# Patient Record
Sex: MALE | Race: WHITE | NOT HISPANIC OR LATINO | Employment: FULL TIME | URBAN - METROPOLITAN AREA
[De-identification: names, ages, dates, MRNs, and addresses within clinical notes are randomized per-mention and may not be internally consistent; named-entity substitution may affect disease eponyms.]

---

## 2017-05-19 ENCOUNTER — HOSPITAL ENCOUNTER (EMERGENCY)
Facility: HOSPITAL | Age: 22
Discharge: HOME | End: 2017-05-19
Attending: EMERGENCY MEDICINE | Admitting: EMERGENCY MEDICINE
Payer: COMMERCIAL

## 2017-05-19 ENCOUNTER — APPOINTMENT (EMERGENCY)
Dept: RADIOLOGY | Facility: HOSPITAL | Age: 22
End: 2017-05-19
Payer: COMMERCIAL

## 2017-05-19 VITALS
BODY MASS INDEX: 46.65 KG/M2 | HEART RATE: 106 BPM | DIASTOLIC BLOOD PRESSURE: 81 MMHG | RESPIRATION RATE: 20 BRPM | TEMPERATURE: 98.5 F | OXYGEN SATURATION: 97 % | WEIGHT: 315 LBS | SYSTOLIC BLOOD PRESSURE: 174 MMHG | HEIGHT: 69 IN

## 2017-05-19 DIAGNOSIS — S92.901A FOOT FRACTURE, RIGHT, CLOSED, INITIAL ENCOUNTER: Primary | ICD-10-CM

## 2017-05-19 PROCEDURE — 99283 EMERGENCY DEPT VISIT LOW MDM: CPT

## 2017-05-19 PROCEDURE — 73630 X-RAY EXAM OF FOOT: CPT

## 2017-05-19 RX ORDER — TRAMADOL HYDROCHLORIDE 50 MG/1
50 TABLET ORAL EVERY 6 HOURS PRN
Qty: 16 TABLET | Refills: 0 | Status: SHIPPED | OUTPATIENT
Start: 2017-05-19 | End: 2017-05-29

## 2017-05-19 RX ORDER — IBUPROFEN 600 MG/1
600 TABLET ORAL ONCE
Status: COMPLETED | OUTPATIENT
Start: 2017-05-19 | End: 2017-05-19

## 2017-05-19 RX ADMIN — IBUPROFEN 600 MG: 600 TABLET, FILM COATED ORAL at 03:27

## 2017-09-20 ENCOUNTER — APPOINTMENT (OUTPATIENT)
Dept: URGENT CARE | Facility: CLINIC | Age: 22
End: 2017-09-20
Payer: OTHER MISCELLANEOUS

## 2017-09-20 ENCOUNTER — HOSPITAL ENCOUNTER (EMERGENCY)
Facility: HOSPITAL | Age: 22
Discharge: HOME | End: 2017-09-20
Attending: EMERGENCY MEDICINE | Admitting: EMERGENCY MEDICINE
Payer: OTHER MISCELLANEOUS

## 2017-09-20 ENCOUNTER — APPOINTMENT (EMERGENCY)
Dept: RADIOLOGY | Facility: HOSPITAL | Age: 22
End: 2017-09-20
Payer: OTHER MISCELLANEOUS

## 2017-09-20 VITALS
SYSTOLIC BLOOD PRESSURE: 148 MMHG | OXYGEN SATURATION: 96 % | HEIGHT: 71 IN | BODY MASS INDEX: 44.1 KG/M2 | HEART RATE: 100 BPM | DIASTOLIC BLOOD PRESSURE: 91 MMHG | TEMPERATURE: 99.5 F | RESPIRATION RATE: 20 BRPM | WEIGHT: 315 LBS

## 2017-09-20 DIAGNOSIS — S93.601A RIGHT FOOT SPRAIN, INITIAL ENCOUNTER: Primary | ICD-10-CM

## 2017-09-20 PROCEDURE — 73630 X-RAY EXAM OF FOOT: CPT

## 2017-09-20 PROCEDURE — 99284 EMERGENCY DEPT VISIT MOD MDM: CPT

## 2017-09-20 PROCEDURE — 99283 EMERGENCY DEPT VISIT LOW MDM: CPT

## 2017-09-20 PROCEDURE — G0383 LEV 4 HOSP TYPE B ED VISIT: HCPCS

## 2017-09-20 RX ORDER — IBUPROFEN 600 MG/1
600 TABLET ORAL ONCE
Status: COMPLETED | OUTPATIENT
Start: 2017-09-20 | End: 2017-09-20

## 2017-09-20 RX ADMIN — IBUPROFEN 600 MG: 600 TABLET, FILM COATED ORAL at 05:07

## 2017-09-25 ENCOUNTER — APPOINTMENT (OUTPATIENT)
Dept: URGENT CARE | Facility: CLINIC | Age: 22
End: 2017-09-25
Payer: OTHER MISCELLANEOUS

## 2017-09-25 PROCEDURE — 99203 OFFICE O/P NEW LOW 30 MIN: CPT

## 2020-05-21 PROCEDURE — 99283 EMERGENCY DEPT VISIT LOW MDM: CPT

## 2020-05-22 ENCOUNTER — HOSPITAL ENCOUNTER (EMERGENCY)
Facility: HOSPITAL | Age: 25
Discharge: HOME | End: 2020-05-22
Attending: EMERGENCY MEDICINE | Admitting: EMERGENCY MEDICINE
Payer: COMMERCIAL

## 2020-05-22 ENCOUNTER — APPOINTMENT (EMERGENCY)
Dept: RADIOLOGY | Facility: HOSPITAL | Age: 25
End: 2020-05-22
Payer: COMMERCIAL

## 2020-05-22 VITALS
HEART RATE: 108 BPM | RESPIRATION RATE: 20 BRPM | DIASTOLIC BLOOD PRESSURE: 97 MMHG | WEIGHT: 315 LBS | OXYGEN SATURATION: 99 % | SYSTOLIC BLOOD PRESSURE: 182 MMHG | HEIGHT: 71 IN | BODY MASS INDEX: 44.1 KG/M2 | TEMPERATURE: 98.2 F

## 2020-05-22 DIAGNOSIS — S83.92XA LEFT KNEE SPRAIN: Primary | ICD-10-CM

## 2020-05-22 PROCEDURE — 73564 X-RAY EXAM KNEE 4 OR MORE: CPT

## 2020-05-22 PROCEDURE — 96374 THER/PROPH/DIAG INJ IV PUSH: CPT

## 2020-05-22 PROCEDURE — 99284 EMERGENCY DEPT VISIT MOD MDM: CPT | Performed by: EMERGENCY MEDICINE

## 2020-05-22 RX ORDER — KETOROLAC TROMETHAMINE 30 MG/ML
15 INJECTION, SOLUTION INTRAMUSCULAR; INTRAVENOUS ONCE
Status: COMPLETED | OUTPATIENT
Start: 2020-05-22 | End: 2020-05-22

## 2020-05-22 RX ORDER — NAPROXEN 500 MG/1
500 TABLET ORAL 2 TIMES DAILY WITH MEALS
Qty: 14 TABLET | Refills: 0 | Status: SHIPPED | OUTPATIENT
Start: 2020-05-22

## 2020-05-22 RX ORDER — ACETAMINOPHEN 325 MG/1
975 TABLET ORAL ONCE
Status: COMPLETED | OUTPATIENT
Start: 2020-05-22 | End: 2020-05-22

## 2020-05-22 RX ADMIN — KETOROLAC TROMETHAMINE 15 MG: 30 INJECTION, SOLUTION INTRAMUSCULAR at 02:27

## 2020-05-22 RX ADMIN — ACETAMINOPHEN 975 MG: 325 TABLET ORAL at 02:26

## 2022-12-29 ENCOUNTER — OFFICE VISIT (OUTPATIENT)
Dept: FAMILY MEDICINE CLINIC | Facility: CLINIC | Age: 27
End: 2022-12-29

## 2022-12-29 VITALS
SYSTOLIC BLOOD PRESSURE: 150 MMHG | RESPIRATION RATE: 18 BRPM | HEART RATE: 95 BPM | OXYGEN SATURATION: 99 % | DIASTOLIC BLOOD PRESSURE: 90 MMHG | TEMPERATURE: 98.6 F | HEIGHT: 71 IN | BODY MASS INDEX: 44.1 KG/M2 | WEIGHT: 315 LBS

## 2022-12-29 DIAGNOSIS — Z11.4 ENCOUNTER FOR SCREENING FOR HIV: ICD-10-CM

## 2022-12-29 DIAGNOSIS — Z13.1 SCREENING FOR DIABETES MELLITUS: ICD-10-CM

## 2022-12-29 DIAGNOSIS — F41.9 ANXIETY: ICD-10-CM

## 2022-12-29 DIAGNOSIS — E73.9 LACTOSE INTOLERANCE: ICD-10-CM

## 2022-12-29 DIAGNOSIS — Z13.6 SCREENING FOR CARDIOVASCULAR CONDITION: ICD-10-CM

## 2022-12-29 DIAGNOSIS — R06.81 WITNESSED EPISODE OF APNEA: ICD-10-CM

## 2022-12-29 DIAGNOSIS — R41.840 ATTENTION AND CONCENTRATION DEFICIT: ICD-10-CM

## 2022-12-29 DIAGNOSIS — R06.83 SNORING: ICD-10-CM

## 2022-12-29 DIAGNOSIS — Z23 ENCOUNTER FOR IMMUNIZATION: ICD-10-CM

## 2022-12-29 DIAGNOSIS — I10 PRIMARY HYPERTENSION: ICD-10-CM

## 2022-12-29 DIAGNOSIS — R10.84 GENERALIZED ABDOMINAL PAIN: Primary | ICD-10-CM

## 2022-12-29 DIAGNOSIS — Z11.59 ENCOUNTER FOR HEPATITIS C SCREENING TEST FOR LOW RISK PATIENT: ICD-10-CM

## 2022-12-29 DIAGNOSIS — R19.4 FREQUENT BOWEL MOVEMENTS: ICD-10-CM

## 2022-12-29 RX ORDER — NAPROXEN SODIUM 220 MG
220 TABLET ORAL 2 TIMES DAILY WITH MEALS
COMMUNITY

## 2022-12-29 NOTE — ASSESSMENT & PLAN NOTE
BMI Counseling: Body mass index is 51 89 kg/m²  The BMI is above normal  Nutrition recommendations include reducing portion sizes, decreasing overall calorie intake and 3-5 servings of fruits/vegetables daily  Exercise recommendations include vigorous aerobic physical activity for 75 minutes/week

## 2022-12-29 NOTE — PROGRESS NOTES
Kp Leaf 1995 male MRN: [id number]    Family Medicine New Patient    ASSESSMENT/PLAN  Problem List Items Addressed This Visit        Cardiovascular and Mediastinum    Primary hypertension    Relevant Orders    Lipid panel    Comprehensive metabolic panel    CBC and differential    TSH, 3rd generation with Free T4 reflex    UA (URINE) with reflex to Scope       Other    Snoring    Relevant Orders    Ambulatory Referral to Sleep Medicine    Witnessed episode of apnea    Relevant Orders    Ambulatory Referral to Sleep Medicine    Lactose intolerance    Frequent bowel movements    Relevant Orders    Lipid panel    Comprehensive metabolic panel    CBC and differential    TSH, 3rd generation with Free T4 reflex    UA (URINE) with reflex to Scope    Stool Enteric Bacterial Panel by PCR    Celiac Disease Panel    BMI 50 0-59 9, adult ([facility])     BMI Counseling: Body mass index is 51 89 kg/m²  The BMI is above normal  Nutrition recommendations include reducing portion sizes, decreasing overall calorie intake and 3-5 servings of fruits/vegetables daily  Exercise recommendations include vigorous aerobic physical activity for 75 minutes/week           Relevant Orders    Lipid panel    Comprehensive metabolic panel    CBC and differential    TSH, 3rd generation with Free T4 reflex    UA (URINE) with reflex to Scope    Generalized abdominal pain - Primary    Relevant Orders    Lipid panel    Comprehensive metabolic panel    CBC and differential    TSH, 3rd generation with Free T4 reflex    UA (URINE) with reflex to Scope    Stool Enteric Bacterial Panel by PCR    Celiac Disease Panel    Attention and concentration deficit    Relevant Orders    Ambulatory Referral to [facility]   Other Visit Diagnoses     Encounter for screening for HIV        Relevant Orders    HIV 1/2 AG/AB W REFLEX [facility] and [facility] only    Encounter for hepatitis C screening test for low risk patient        Relevant Orders    Hepatitis C antibody Screening for diabetes mellitus        Relevant Orders    Lipid panel    Comprehensive metabolic panel    Screening for cardiovascular condition        Relevant Orders    Lipid panel    Comprehensive metabolic panel    Encounter for immunization        Relevant Orders    TDAP VACCINE GREATER THAN OR EQUAL TO 8YO IM (Completed)    Anxiety        Relevant Orders    [facility]          Follow up in 2 months for CP/blood pressure follow up/lab review         Future Appointments   Date Time Provider Alexandra Denise   2/28/2023  3:30 PM DO [facility]          SUBJECTIVE  CC: New Patient Visit (Establish care), GI Problem (Pt reports he is having about 5 solid bowel movements every day  ), Snoring (Pt reports he was told by his wife he snores at night and he stops breathing, pts wife will startle him at night to get him back into normal breathing  Pt reports he tosses and turns at night, has to get up to go to the bathroom  ), and Difficulty staying on task (Pt reports difficulty staying on task and completing tasks at work and at home  Pt would like to be evaluated for ADHD  Modesta Ruth cancelling headphones and music help at times  )      HPI:  Lita Salinas is a 32 y o  male who presents for establish care  He is , 3 kids age 9 through 2  A number of concerns today    HPI    Review of Systems   Constitutional: Negative for chills and fever  HENT: Negative for congestion, postnasal drip, rhinorrhea and sinus pain  Eyes: Negative for photophobia and visual disturbance  Respiratory: Negative for cough and shortness of breath  Cardiovascular: Negative for chest pain, palpitations and leg swelling  Gastrointestinal: Positive for abdominal pain  Negative for constipation, diarrhea, nausea and vomiting  Genitourinary: Negative for difficulty urinating and dysuria  Musculoskeletal: Negative for arthralgias and myalgias     Skin: Negative for rash    Neurological: Negative for dizziness and syncope  Historical Information   The patient history was reviewed as follows:    Past Medical History:   Diagnosis Date   • Malignant hyperthemia     Family history of malignant hyperthermia, pt never tested for it     Past Surgical History:   Procedure Laterality Date   • EAR SURGERY     • FRACTURE SURGERY Right     Rt foot Fx 5th metatarsal surgery 05/2017, with internal pin   • TONSILLECTOMY       History reviewed  No pertinent family history  Social History   Social History     Substance and Sexual Activity   Alcohol Use Yes    Comment: socially     Social History     Substance and Sexual Activity   Drug Use No     Social History     Tobacco Use   Smoking Status Never   Smokeless Tobacco Former   • Quit date: 6/20/2015       Medications:     Current Outpatient Medications:   •  naproxen sodium (Aleve) 220 MG tablet, Take 220 mg by mouth 2 (two) times a day with meals, Disp: , Rfl:   •  naproxen (NAPROSYN) 500 mg tablet, Take 1 tablet (500 mg total) by mouth 2 (two) times a day with meals (Patient not taking: Reported on 12/29/2022), Disp: 14 tablet, Rfl: 0  Allergies   Allergen Reactions   • Brashear [Fish Oil - Food Allergy] Anaphylaxis   • Nuts - Food Allergy Throat Swelling     PISTACHIOS only   • Other      Mold and pollen   • Latex Rash       OBJECTIVE    Vitals:   Vitals:    12/29/22 1529 12/29/22 1602   BP: (!) 160/102 150/90   BP Location: Right arm    Patient Position: Sitting    Cuff Size: Large    Pulse: (!) 120 95   Resp: 18    Temp: 98 6 °F (37 °C)    TempSrc: Tympanic    SpO2: 99%    Weight: (!) 166 kg (366 lb 12 8 oz)    Height: 5' 10 5" (1 791 m)            Physical Exam  Constitutional:       Appearance: He is well-developed  HENT:      Head: Normocephalic and atraumatic        Right Ear: External ear normal       Left Ear: External ear normal    Eyes:      Conjunctiva/sclera: Conjunctivae normal       Pupils: Pupils are equal, round, and reactive to light  Cardiovascular:      Rate and Rhythm: Normal rate and regular rhythm  Heart sounds: Normal heart sounds  No murmur heard  Pulmonary:      Effort: Pulmonary effort is normal  No respiratory distress  Breath sounds: Normal breath sounds  No wheezing  Abdominal:      General: Bowel sounds are normal  There is no distension  Palpations: Abdomen is soft  Musculoskeletal:         General: Normal range of motion  Cervical back: Normal range of motion and neck supple  Skin:     General: Skin is warm and dry  Neurological:      Mental Status: He is alert and oriented to person, place, and time     Psychiatric:         Behavior: Behavior normal             Labs:        DO Leona    12/29/2022

## 2023-02-21 LAB
ALBUMIN SERPL-MCNC: 4.5 G/DL (ref 4.1–5.2)
ALBUMIN/GLOB SERPL: 1.7 {RATIO} (ref 1.2–2.2)
ALP SERPL-CCNC: 91 IU/L (ref 44–121)
ALT SERPL-CCNC: 25 IU/L (ref 0–44)
APPEARANCE UR: CLEAR
AST SERPL-CCNC: 13 IU/L (ref 0–40)
BASOPHILS # BLD AUTO: 0.1 X10E3/UL (ref 0–0.2)
BASOPHILS NFR BLD AUTO: 0 %
BILIRUB SERPL-MCNC: 0.3 MG/DL (ref 0–1.2)
BILIRUB UR QL STRIP: NEGATIVE
BUN SERPL-MCNC: 12 MG/DL (ref 6–20)
BUN/CREAT SERPL: 17 (ref 9–20)
CALCIUM SERPL-MCNC: 9.5 MG/DL (ref 8.7–10.2)
CHLORIDE SERPL-SCNC: 103 MMOL/L (ref 96–106)
CHOLEST SERPL-MCNC: 170 MG/DL (ref 100–199)
CHOLEST/HDLC SERPL: 5.7 RATIO (ref 0–5)
CO2 SERPL-SCNC: 26 MMOL/L (ref 20–29)
COLOR UR: YELLOW
CREAT SERPL-MCNC: 0.7 MG/DL (ref 0.76–1.27)
EGFR: 130 ML/MIN/1.73
ENDOMYSIUM IGA SER QL: NEGATIVE
EOSINOPHIL # BLD AUTO: 0.3 X10E3/UL (ref 0–0.4)
EOSINOPHIL NFR BLD AUTO: 3 %
ERYTHROCYTE [DISTWIDTH] IN BLOOD BY AUTOMATED COUNT: 14.7 % (ref 11.6–15.4)
GLOBULIN SER-MCNC: 2.6 G/DL (ref 1.5–4.5)
GLUCOSE SERPL-MCNC: 103 MG/DL (ref 70–99)
GLUCOSE UR QL: NEGATIVE
HCT VFR BLD AUTO: 44.4 % (ref 37.5–51)
HCV AB S/CO SERPL IA: NON REACTIVE
HDLC SERPL-MCNC: 30 MG/DL
HGB BLD-MCNC: 14.8 G/DL (ref 13–17.7)
HGB UR QL STRIP: NEGATIVE
HIV 1+2 AB+HIV1 P24 AG SERPL QL IA: NON REACTIVE
IGA SERPL-MCNC: 208 MG/DL (ref 90–386)
IMM GRANULOCYTES # BLD: 0.1 X10E3/UL (ref 0–0.1)
IMM GRANULOCYTES NFR BLD: 1 %
KETONES UR QL STRIP: NEGATIVE
LDLC SERPL CALC-MCNC: 123 MG/DL (ref 0–99)
LEUKOCYTE ESTERASE UR QL STRIP: NEGATIVE
LYMPHOCYTES # BLD AUTO: 2.4 X10E3/UL (ref 0.7–3.1)
LYMPHOCYTES NFR BLD AUTO: 21 %
MCH RBC QN AUTO: 25.9 PG (ref 26.6–33)
MCHC RBC AUTO-ENTMCNC: 33.3 G/DL (ref 31.5–35.7)
MCV RBC AUTO: 78 FL (ref 79–97)
MICRO URNS: NORMAL
MONOCYTES # BLD AUTO: 0.8 X10E3/UL (ref 0.1–0.9)
MONOCYTES NFR BLD AUTO: 7 %
NEUTROPHILS # BLD AUTO: 7.7 X10E3/UL (ref 1.4–7)
NEUTROPHILS NFR BLD AUTO: 68 %
NITRITE UR QL STRIP: NEGATIVE
PH UR STRIP: 6 [PH] (ref 5–7.5)
PLATELET # BLD AUTO: 374 X10E3/UL (ref 150–450)
POTASSIUM SERPL-SCNC: 4.8 MMOL/L (ref 3.5–5.2)
PROT SERPL-MCNC: 7.1 G/DL (ref 6–8.5)
PROT UR QL STRIP: NEGATIVE
RBC # BLD AUTO: 5.72 X10E6/UL (ref 4.14–5.8)
SL AMB VLDL CHOLESTEROL CALC: 17 MG/DL (ref 5–40)
SODIUM SERPL-SCNC: 142 MMOL/L (ref 134–144)
SP GR UR: 1.02 (ref 1–1.03)
TRIGL SERPL-MCNC: 90 MG/DL (ref 0–149)
TSH SERPL DL<=0.005 MIU/L-ACNC: 1.43 UIU/ML (ref 0.45–4.5)
TTG IGA SER-ACNC: <2 U/ML (ref 0–3)
UROBILINOGEN UR STRIP-ACNC: 0.2 MG/DL (ref 0.2–1)
WBC # BLD AUTO: 11.4 X10E3/UL (ref 3.4–10.8)

## 2023-02-28 ENCOUNTER — OFFICE VISIT (OUTPATIENT)
Dept: FAMILY MEDICINE CLINIC | Facility: CLINIC | Age: 28
End: 2023-02-28

## 2023-02-28 ENCOUNTER — APPOINTMENT (OUTPATIENT)
Dept: RADIOLOGY | Facility: CLINIC | Age: 28
End: 2023-02-28

## 2023-02-28 VITALS
BODY MASS INDEX: 44.1 KG/M2 | OXYGEN SATURATION: 98 % | WEIGHT: 315 LBS | HEIGHT: 71 IN | DIASTOLIC BLOOD PRESSURE: 104 MMHG | RESPIRATION RATE: 17 BRPM | TEMPERATURE: 99.1 F | HEART RATE: 104 BPM | SYSTOLIC BLOOD PRESSURE: 162 MMHG

## 2023-02-28 DIAGNOSIS — I10 PRIMARY HYPERTENSION: ICD-10-CM

## 2023-02-28 DIAGNOSIS — G89.29 CHRONIC BILATERAL LOW BACK PAIN WITHOUT SCIATICA: ICD-10-CM

## 2023-02-28 DIAGNOSIS — R73.01 IFG (IMPAIRED FASTING GLUCOSE): ICD-10-CM

## 2023-02-28 DIAGNOSIS — M54.50 CHRONIC BILATERAL LOW BACK PAIN WITHOUT SCIATICA: ICD-10-CM

## 2023-02-28 DIAGNOSIS — Z00.00 ANNUAL PHYSICAL EXAM: Primary | ICD-10-CM

## 2023-02-28 DIAGNOSIS — E78.2 MIXED HYPERLIPIDEMIA: ICD-10-CM

## 2023-02-28 RX ORDER — LISINOPRIL 20 MG/1
20 TABLET ORAL DAILY
Qty: 90 TABLET | Refills: 3 | Status: SHIPPED | OUTPATIENT
Start: 2023-02-28

## 2023-02-28 NOTE — PROGRESS NOTES
[facility]    NAME: Rebeca Almonte  AGE: 32 y o  SEX: male  : 1995     DATE: 2023     Assessment and Plan:     Problem List Items Addressed This Visit        Endocrine    IFG (impaired fasting glucose)       Cardiovascular and Mediastinum    Primary hypertension    Relevant Medications    lisinopril (ZESTRIL) 20 mg tablet       Other    Mixed hyperlipidemia    Chronic bilateral low back pain without sciatica    Relevant Orders    XR spine lumbar minimum 4 views non injury   Other Visit Diagnoses     Annual physical exam    -  Primary          Immunizations and preventive care screenings were discussed with patient today  Appropriate education was printed on patient's after visit summary  Counseling:  Alcohol/drug use: discussed moderation in alcohol intake, the recommendations for healthy alcohol use, and avoidance of illicit drug use  Dental Health: discussed importance of regular tooth brushing, flossing, and dental visits  Injury prevention: discussed safety/seat belts, safety helmets, smoke detectors, carbon dioxide detectors, and smoking near bedding or upholstery  Sexual health: discussed sexually transmitted diseases, partner selection, use of condoms, avoidance of unintended pregnancy, and contraceptive alternatives  · Exercise: the importance of regular exercise/physical activity was discussed  Recommend exercise 3-5 times per week for at least 30 minutes  Return in about 4 weeks (around 3/28/2023) for blood pressure check up  Chief Complaint:     Chief Complaint   Patient presents with   • Annual Exam     Review lab work  History of Present Illness:     Adult Annual Physical   Patient here for a comprehensive physical exam       Diet and Physical Activity  · Diet/Nutrition: well balanced diet  · Exercise: moderate cardiovascular exercise        Depression Screening  PHQ-2/9 Depression Screening    Little interest or pleasure in doing things: 0 - not at all  Feeling down, depressed, or hopeless: 0 - not at all  PHQ-2 Score: 0  PHQ-2 Interpretation: Negative depression screen          Review of Systems:     Review of Systems   Constitutional: Negative for chills and fever  HENT: Negative for congestion, postnasal drip, rhinorrhea and sinus pain  Eyes: Negative for photophobia and visual disturbance  Respiratory: Negative for cough and shortness of breath  Cardiovascular: Negative for chest pain, palpitations and leg swelling  Gastrointestinal: Negative for abdominal pain, constipation, diarrhea, nausea and vomiting  Genitourinary: Negative for difficulty urinating and dysuria  Musculoskeletal: Negative for arthralgias and myalgias  Skin: Negative for rash  Neurological: Negative for dizziness and syncope        Past Medical History:     Past Medical History:   Diagnosis Date   • Malignant hyperthemia     Family history of malignant hyperthermia, pt never tested for it      Past Surgical History:     Past Surgical History:   Procedure Laterality Date   • EAR SURGERY     • FRACTURE SURGERY Right     Rt foot Fx 5th metatarsal surgery 05/2017, with internal pin   • TONSILLECTOMY        Social History:     Social History     Socioeconomic History   • Marital status: /Civil Union     Spouse name: None   • Number of children: None   • Years of education: None   • Highest education level: None   Occupational History   • None   Tobacco Use   • Smoking status: Never   • Smokeless tobacco: Former     Quit date: 6/20/2015   Vaping Use   • Vaping Use: Never used   Substance and Sexual Activity   • Alcohol use: Yes     Comment: socially   • Drug use: No   • Sexual activity: None   Other Topics Concern   • None   Social History Narrative   • None     Social Determinants of Health     Financial Resource Strain: Not on file   Food Insecurity: Not on file   Transportation Needs: Not on file   Physical Activity: Not on file   Stress: Not on file   Social Connections: Not on file   Intimate Partner Violence: Not At Risk   • Fear of Current or Ex-Partner: No   • Emotionally Abused: No   • Physically Abused: No   • Sexually Abused: No   Housing Stability: Not on file      Family History:     History reviewed  No pertinent family history  Current Medications:     Current Outpatient Medications   Medication Sig Dispense Refill   • lisinopril (ZESTRIL) 20 mg tablet Take 1 tablet (20 mg total) by mouth daily 90 tablet 3   • naproxen sodium (ALEVE) 220 MG tablet Take 220 mg by mouth every 12 (twelve) hours as needed       No current facility-administered medications for this visit  Allergies: Allergies   Allergen Reactions   • [place] [Fish Oil - Food Allergy] Anaphylaxis   • Nuts - Food Allergy Throat Swelling     PISTACHIOS only   • Other      Mold and pollen   • Latex Rash      Physical Exam:     BP (!) 162/104 (BP Location: Left arm, Patient Position: Sitting, Cuff Size: Large)   Pulse 104   Temp 99 1 °F (37 3 °C) (Tympanic)   Resp 17   Ht 5' 10 5" (1 791 m)   Wt (!) 166 kg (366 lb 12 8 oz)   SpO2 98%   BMI 51 89 kg/m²     Physical Exam  Constitutional:       General: He is not in acute distress  Appearance: Normal appearance  He is not ill-appearing, toxic-appearing or diaphoretic  HENT:      Head: Normocephalic and atraumatic  Right Ear: Tympanic membrane and ear canal normal       Left Ear: Tympanic membrane and ear canal normal       Nose: Nose normal  No congestion  Mouth/Throat:      Mouth: Mucous membranes are moist       Pharynx: Oropharynx is clear  No oropharyngeal exudate  Eyes:      Extraocular Movements: Extraocular movements intact  Conjunctiva/sclera: Conjunctivae normal       Pupils: Pupils are equal, round, and reactive to light  Cardiovascular:      Rate and Rhythm: Normal rate and regular rhythm  Pulses: Normal pulses        Heart sounds: No murmur heard  Pulmonary:      Effort: Pulmonary effort is normal       Breath sounds: Normal breath sounds  No wheezing, rhonchi or rales  Abdominal:      General: Bowel sounds are normal  There is no distension  Palpations: Abdomen is soft  Tenderness: There is no abdominal tenderness  Musculoskeletal:         General: No swelling or tenderness  Normal range of motion  Cervical back: Normal range of motion and neck supple  Skin:     General: Skin is warm and dry  Capillary Refill: Capillary refill takes less than 2 seconds  Neurological:      General: No focal deficit present  Mental Status: He is alert and oriented to person, place, and time  Cranial Nerves: No cranial nerve deficit  Psychiatric:         Mood and Affect: Mood normal          Behavior: Behavior normal          Thought Content:  Thought content normal           [place], DO   [address]

## 2023-03-02 ENCOUNTER — TELEPHONE (OUTPATIENT)
Dept: FAMILY MEDICINE CLINIC | Facility: CLINIC | Age: 28
End: 2023-03-02

## 2023-03-02 NOTE — TELEPHONE ENCOUNTER
Patient just started on lisinopril Tuesday night  He took his BP last night it was 139/74  Today feels very light headed and tired  Was sent home from work from being so dizzy  I told him to check pressure, it was 142/74  I told him to make sure he is drinking water and we will let him know what to do    Please advise

## 2023-03-14 ENCOUNTER — OFFICE VISIT (OUTPATIENT)
Dept: SLEEP CENTER | Facility: HOSPITAL | Age: 28
End: 2023-03-14

## 2023-03-14 VITALS
SYSTOLIC BLOOD PRESSURE: 138 MMHG | DIASTOLIC BLOOD PRESSURE: 96 MMHG | BODY MASS INDEX: 45.1 KG/M2 | HEIGHT: 70 IN | WEIGHT: 315 LBS | OXYGEN SATURATION: 96 % | HEART RATE: 108 BPM

## 2023-03-14 DIAGNOSIS — G47.19 EXCESSIVE DAYTIME SLEEPINESS: ICD-10-CM

## 2023-03-14 DIAGNOSIS — R06.83 SNORING: ICD-10-CM

## 2023-03-14 DIAGNOSIS — E66.01 MORBID OBESITY (HCC): ICD-10-CM

## 2023-03-14 DIAGNOSIS — F45.8 BRUXISM: ICD-10-CM

## 2023-03-14 DIAGNOSIS — R06.81 WITNESSED EPISODE OF APNEA: ICD-10-CM

## 2023-03-14 DIAGNOSIS — I10 ESSENTIAL HYPERTENSION: ICD-10-CM

## 2023-03-14 DIAGNOSIS — G47.33 OSA (OBSTRUCTIVE SLEEP APNEA): Primary | ICD-10-CM

## 2023-03-14 NOTE — PROGRESS NOTES
Consultation - Kurt  32 y o  male  :1995  DUNG:[id number]  DOS:3/14/2023    Physician Requesting Consult: Alexandra Jaime DO             Reason for Consult : At your kind request I saw Sander Quesada for initial sleep evaluation today  The patient is here to evaluate for suspected Obstructive Sleep Apnea  PFSH, Problem List, Medications & Allergies were reviewed in EMR  Nik Walker  has a past medical history of Malignant hyperthemia  He has a current medication list which includes the following prescription(s): lisinopril and naproxen sodium  HPI: He presents with complaints of snoring since childhood and wife has witnessed apneas  At times he awakens himself with choking or gasping  Symptoms are worse when he sleeps supine and somewhat improved by sleeping in the lateral position  Other Complaints: None  Restless Leg Syndrome: reports no suggestive symptoms  Parasomnia: reports teeth grinding during sleep;   Sleep Routine (on average): Typical Bedtime: 9 PM gets OOB: 5 AM TIB:8 hrs  Sleep latency: upto 60 minutes, he is on his phone for a while but is not struggling to fall asleep   sleep Interruptions:infrequent/nite  Awakens: needing an alarm  Upon awakening: is not always refreshed  Nik Walker reports excessive daytime sleepiness [takes planned naps and dozes off when sedentary at home]  He rated [himself] at Total score: 11 /24 on the [place] Sleepiness Scale  Habits:   reports that he has never smoked  He quit smokeless tobacco use about 7 years ago ;  reports current alcohol use ;[ reports no history of drug use  ];[  E-Cigarette/Vaping   • E-Cigarette Use Never User    ]; Caffeine use:excessive until around 7 PM; Exercise routine: none but is physically active in his job  Family History: Father has obstructive sleep apnea  ROS: Significant for approximately 65 pounds weight gain in past 2 years  He reported no nasal, respiratory or cardiac symptoms  Roanna Schlatter EXAM:  /96 (BP Location: Left arm, Patient Position: Sitting, Cuff Size: Standard)   Pulse (!) 108   Ht 5' 10" (1 778 m)   Wt (!) 166 kg (365 lb)   SpO2 96%   BMI 52 37 kg/m²    General: Well groomed male, well appearing, in no apparent distress  Neurological: Alert and orientated; cooperative; Cranial nerves intact;    Psychiatric: Speech: Clear and coherent; normal mood, affect & thought   Skin: Warm and dry; Color& Hydration good; no facial rashes or lesions   HEENT:  Craniofacial anatomy: normal Sinuses: Non-tender  TMJ: Normal   Eyes: EOM's intact; conjunctiva/corneas clear   Ears: Externally appear normal     Nasal Airway: is patent Septum: Intact; Mucosa: Normal; Turbinates: Normal; Rhinorrhea: None  Mouth: Lips: Normal posture; Dentition: normal   Mucosa: Moist; Hard Palate:normal    Oropharryx: crowded and AP narrowing Tongue: Mallampati:Class IV, Mobile and MacroglossiaSoft Palate:  redundant  Tonsils: absent  Neck:[is thick and excess fatty tissue;] [Neck Circumference: 18 ";] Supple; no abnormal masses; Thyroid: Normal  Trachea: Central     Lymph: No cervical or submandibular Lymhadenopathy  Heart: S1,S2 normal; RRR; no gallop; no murmur s  Lungs: Respiratory Effort: Normal  Air entry good bilaterally  No wheezes  No rales  Abdomen: Obese, soft & non-tender    Extremities: No pedal edema  No clubbing or cyanosis  Musculoskeletal:  Motor normal; Gait: Normal        Reviewed last CBC and CMP that were essentially normal    IMPRESSION: Primary/Secondary Sleep Diagnoses (to Medical or Psych conditions) & Comorbidities   1  SUGAR (obstructive sleep apnea)  Home Study      2  Snoring  Ambulatory Referral to Sleep Medicine      3  Witnessed episode of apnea  Ambulatory Referral to Sleep Medicine      4  Excessive daytime sleepiness        5  Bruxism        6  Essential hypertension        7  Morbid obesity ([facility] [address] )             PLAN:  1   With respect to above conditions, comprehensive counseling provided on pathophysiology; effects on symptoms and comorbidities, diagnostic strategies & limitations; treatment options; risks or no treament; risks & benefits of the various therapeutic options; costs and insurance aspects  2  I advised weight reduction, avoiding sleeping supine, using alcohol or sedating medications close to bed time and on safe driving practices  3  Nocturnal polysomnography is indicated and HST will be scheduled  4   Patient is willing to try Positive airway pressure therapy and will be scheduled for a titration study if indicated  5  Follow-up to be scheduled after the studies to review results, further details of treatment options and to initiate/adjust therapy  Sincerely,      Authenticated electronically on 10/90/98   Board Certified Specialist     Portions of the record may have been created with voice recognition software  Occasional wrong word or "sound a like" substitutions may have occurred due to the inherent limitations of voice recognition software  There may also be notations and random deletions of words or characters from malfunctioning software  Read the chart carefully and recognize, using context, where substitutions/deletions have occurred

## 2023-03-14 NOTE — PATIENT INSTRUCTIONS
What is SUGAR? Obstructive sleep apnea is a common and serious sleep disorder that causes you to stop breathing during sleep  The airway repeatedly becomes blocked, limiting the amount of air that reaches your lungs  When this happens, you may snore loudly or making choking noises as you try to breathe  Your brain and body becomes oxygen deprived and you may wake up  This may happen a few times a night, or in more severe cases, several hundred times a night  Sleep apnea can make you wake up in the morning feeling tired or unrefreshed even though you have had a full night of sleep  During the day, you may feel fatigued, have difficulty concentrating or you may even unintentionally fall asleep  This is because your body is waking up numerous times throughout the night, even though you might not be conscious of each awakening  The lack of oxygen your body receives can have negative long-term consequences for your health  This includes:  High blood pressure  Heart disease  Irregular heart rhythms  Stroke  Pre-diabetes and diabetes  Depression  Testing  An objective evaluation of your sleep may be needed before your board certified sleep physician can make a diagnosis  Options include:   In-lab overnight sleep study  This type of sleep study requires you to stay overnight at a sleep center, in a bed that may resemble a hotel room  You will sleep with sensors hooked up to various parts of your body  These sensors record your brain waves, heartbeat, breathing and movement  An overnight sleep study also provides your doctor with the most complete information about your sleep  Learn more about an overnight sleep study  Home sleep apnea test  Some patients with high risk factors for obstructive sleep apnea and no other medical disorders may be candidates for a home sleep apnea test  The testing equipment differs in that it is less complicated than what is used in an overnight sleep study   As such, does not give all the data an in-lab will and if negative, may not mean you do not have the problem  Treatment for sleep apnea includes using a continuous positive airway pressure (CPAP) machine to keep your airway open during sleep  A mask is placed over your nose and mouth, or just your nose  The mask is hooked to the CPAP machine that blows a gentle stream of air into the mask when you breathe  This helps keep your airway open so you can breathe more regularly  Extra oxygen may be given to you through the machine  You may be given a mouth device  It looks like a mouth guard or dental retainer and stops your tongue and mouth tissues from blocking your throat while you sleep  Surgery may be needed to remove extra tissues that block your mouth, throat, or nose  Manage sleep apnea:   Do not smoke  Nicotine and other chemicals in cigarettes and cigars can cause lung damage  Ask your healthcare provider for information if you currently smoke and need help to quit  E-cigarettes or smokeless tobacco still contain nicotine  Talk to your healthcare provider before you use these products  Do not drink alcohol or take sedative medicine before you go to sleep  Alcohol and sedatives can relax the muscles and tissues around your throat  This can block the airflow to your lungs  Maintain a healthy weight  Excess tissue around your throat may restrict your breathing  Ask your healthcare provider for information if you need to lose weight  Sleep on your side or use pillows designed to prevent sleep apnea  This prevents your tongue or other tissues from blocking your throat  You can also raise the head of your bed  Driving Safety  Refrain from driving when drowsy  Follow up with your healthcare provider as directed: Write down your questions so you remember to ask them during your visits  Go to AASM website for more information: Sleepeducation  org  What is SUGAR?    Obstructive sleep apnea is a common and serious sleep disorder that causes you to stop breathing during sleep  The airway repeatedly becomes blocked, limiting the amount of air that reaches your lungs  When this happens, you may snore loudly or making choking noises as you try to breathe  Your brain and body becomes oxygen deprived and you may wake up  This may happen a few times a night, or in more severe cases, several hundred times a night  Sleep apnea can make you wake up in the morning feeling tired or unrefreshed even though you have had a full night of sleep  During the day, you may feel fatigued, have difficulty concentrating or you may even unintentionally fall asleep  This is because your body is waking up numerous times throughout the night, even though you might not be conscious of each awakening  The lack of oxygen your body receives can have negative long-term consequences for your health  This includes:  High blood pressure  Heart disease  Irregular heart rhythms  Stroke  Pre-diabetes and diabetes  Depression  Testing  An objective evaluation of your sleep may be needed before your board certified sleep physician can make a diagnosis  Options include:   In-lab overnight sleep study  This type of sleep study requires you to stay overnight at a sleep center, in a bed that may resemble a hotel room  You will sleep with sensors hooked up to various parts of your body  These sensors record your brain waves, heartbeat, breathing and movement  An overnight sleep study also provides your doctor with the most complete information about your sleep  Learn more about an overnight sleep study  Home sleep apnea test  Some patients with high risk factors for obstructive sleep apnea and no other medical disorders may be candidates for a home sleep apnea test  The testing equipment differs in that it is less complicated than what is used in an overnight sleep study  As such, does not give all the data an in-lab will and if negative, may not mean you do not have the problem    Treatment for sleep apnea includes using a continuous positive airway pressure (CPAP) machine to keep your airway open during sleep  A mask is placed over your nose and mouth, or just your nose  The mask is hooked to the CPAP machine that blows a gentle stream of air into the mask when you breathe  This helps keep your airway open so you can breathe more regularly  Extra oxygen may be given to you through the machine  You may be given a mouth device  It looks like a mouth guard or dental retainer and stops your tongue and mouth tissues from blocking your throat while you sleep  Surgery may be needed to remove extra tissues that block your mouth, throat, or nose  Manage sleep apnea:   Do not smoke  Nicotine and other chemicals in cigarettes and cigars can cause lung damage  Ask your healthcare provider for information if you currently smoke and need help to quit  E-cigarettes or smokeless tobacco still contain nicotine  Talk to your healthcare provider before you use these products  Do not drink alcohol or take sedative medicine before you go to sleep  Alcohol and sedatives can relax the muscles and tissues around your throat  This can block the airflow to your lungs  Maintain a healthy weight  Excess tissue around your throat may restrict your breathing  Ask your healthcare provider for information if you need to lose weight  Sleep on your side or use pillows designed to prevent sleep apnea  This prevents your tongue or other tissues from blocking your throat  You can also raise the head of your bed  Driving Safety  Refrain from driving when drowsy  Follow up with your healthcare provider as directed: Write down your questions so you remember to ask them during your visits  Go to AAS website for more information: Sleepeducation  org    Nursing Support:  When: Monday through Friday 7A-5PM except holidays  Where: Our direct line is [phone]  If you are having a true emergency please call 911    In the event that the line is busy or it is after hours please leave a voice message and we will return your call  Please speak clearly, leaving your full name, birth date, best number to reach you and the reason for your call  Medication refills: We will need the name of the medication, the dosage, the ordering provider, whether you get a 30 or 90 day refill, and the pharmacy name and address  Medications will be ordered by the provider only  Nurses cannot call in prescriptions  Please allow 7 days for medication refills  Physician requested updates: If your provider requested that you call with an update after starting medication, please be ready to provide us the medication and dosage, what time you take your medication, the time you attempt to fall asleep, time you fall asleep, when you wake up, and what time you get out of bed  Sleep Study Results: We will contact you with sleep study results and/or next steps after the physician has reviewed your testing

## 2023-03-28 ENCOUNTER — OFFICE VISIT (OUTPATIENT)
Dept: LAB | Facility: CLINIC | Age: 28
End: 2023-03-28

## 2023-03-28 ENCOUNTER — OFFICE VISIT (OUTPATIENT)
Dept: FAMILY MEDICINE CLINIC | Facility: CLINIC | Age: 28
End: 2023-03-28

## 2023-03-28 VITALS
TEMPERATURE: 98.6 F | DIASTOLIC BLOOD PRESSURE: 84 MMHG | RESPIRATION RATE: 18 BRPM | HEIGHT: 70 IN | BODY MASS INDEX: 45.1 KG/M2 | OXYGEN SATURATION: 98 % | SYSTOLIC BLOOD PRESSURE: 140 MMHG | WEIGHT: 315 LBS | HEART RATE: 111 BPM

## 2023-03-28 DIAGNOSIS — R73.01 IFG (IMPAIRED FASTING GLUCOSE): ICD-10-CM

## 2023-03-28 DIAGNOSIS — E78.2 MIXED HYPERLIPIDEMIA: ICD-10-CM

## 2023-03-28 DIAGNOSIS — I10 PRIMARY HYPERTENSION: Primary | ICD-10-CM

## 2023-03-28 DIAGNOSIS — R79.89 ABNORMAL CBC: ICD-10-CM

## 2023-03-28 DIAGNOSIS — I10 PRIMARY HYPERTENSION: ICD-10-CM

## 2023-03-28 LAB
ATRIAL RATE: 98 BPM
P AXIS: 31 DEGREES
PR INTERVAL: 136 MS
QRS AXIS: 35 DEGREES
QRSD INTERVAL: 104 MS
QT INTERVAL: 340 MS
QTC INTERVAL: 434 MS
T WAVE AXIS: 35 DEGREES
VENTRICULAR RATE: 98 BPM

## 2023-03-28 NOTE — LETTER
March 28, 2023     Patient: Xi Baeza  YOB: 1995  Date of Visit: 3/28/2023      To Whom it May Concern:    Xi Baeza is under my professional care  Liz Marin was seen in my office on 3/28/2023  He is being treated for high blood pressure  He was advised to drink increased water and fluids as he is on medication for treatment  This will cause him to have increased frequency of need for use of the restroom  If you have any questions or concerns, please don't hesitate to call           Sincerely,          DO Leona        CC: No Recipients

## 2023-03-28 NOTE — ASSESSMENT & PLAN NOTE
BP is much better today since our last visit  Continue lisinopril 20 mg daily  Gets labs done prior to next visit

## 2023-03-28 NOTE — PROGRESS NOTES
Francoise Zapien 1995 male MRN: [id number]    Family Medicine Follow-up Visit    ASSESSMENT/PLAN  Problem List Items Addressed This Visit        Endocrine    IFG (impaired fasting glucose)    Relevant Orders    Lipid panel    Comprehensive metabolic panel    CBC and differential    Hemoglobin A1C    UA (URINE) with reflex to Scope       Cardiovascular and Mediastinum    Primary hypertension - Primary     BP is much better today since our last visit  Continue lisinopril 20 mg daily  Gets labs done prior to next visit          Relevant Orders    Lipid panel    Comprehensive metabolic panel    CBC and differential    Hemoglobin A1C    UA (URINE) with reflex to Scope    ECG 12 lead       Other    Mixed hyperlipidemia    Relevant Orders    Lipid panel    Comprehensive metabolic panel    CBC and differential    Hemoglobin A1C    UA (URINE) with reflex to Scope    Abnormal CBC    Relevant Orders    CBC and differential     Follow up in May/June for BP follow up with labs prior  Call sooner if any issues or concerns              Future Appointments   Date Time Provider Alexandra Walker   4/24/2023  2:30 PM QU HOME STUDY ROOM QU Sleep lab [facility]          SUBJECTIVE  CC: Blood Pressure Check (Patient reports no problem since starting his Lisinopril 20mg, but notes that he's had to urinate more frequently since starting the medication  )      HPI:  Francoise Zapien is a 32 y o  male who presents for blood pressure follow up  He reports he is tolerating the lisinopril he is drinking more water and feeling better  Home BP readings have been around 140/70  HPI    Review of Systems   Constitutional: Negative for chills and fever  HENT: Negative for congestion, postnasal drip, rhinorrhea and sinus pain  Eyes: Negative for photophobia and visual disturbance  Respiratory: Negative for cough and shortness of breath  Cardiovascular: Negative for chest pain, palpitations and leg swelling     Gastrointestinal: Negative "for abdominal pain, constipation, diarrhea, nausea and vomiting  Genitourinary: Negative for difficulty urinating and dysuria  Musculoskeletal: Negative for arthralgias and myalgias  Skin: Negative for rash  Neurological: Negative for dizziness and syncope  Historical Information   The patient history was reviewed as follows:    Past Medical History:   Diagnosis Date   • Malignant hyperthemia     Family history of malignant hyperthermia, pt never tested for it     Past Surgical History:   Procedure Laterality Date   • EAR SURGERY     • FRACTURE SURGERY Right     Rt foot Fx 5th metatarsal surgery 05/2017, with internal pin   • TONSILLECTOMY       History reviewed  No pertinent family history     Social History   Social History     Substance and Sexual Activity   Alcohol Use Yes    Comment: socially     Social History     Substance and Sexual Activity   Drug Use No     Social History     Tobacco Use   Smoking Status Never   Smokeless Tobacco Former   • Quit date: 6/20/2015       Medications:     Current Outpatient Medications:   •  Ibuprofen (ADVIL PO), Take by mouth if needed, Disp: , Rfl:   •  lisinopril (ZESTRIL) 20 mg tablet, Take 1 tablet (20 mg total) by mouth daily, Disp: 90 tablet, Rfl: 3  •  naproxen sodium (ALEVE) 220 MG tablet, Take 220 mg by mouth every 12 (twelve) hours as needed (Patient not taking: Reported on 3/28/2023), Disp: , Rfl:   Allergies   Allergen Reactions   • [place] [Fish Oil - Food Allergy] Anaphylaxis   • Nuts - Food Allergy Throat Swelling     PISTACHIOS only   • Other      Mold and pollen   • Latex Rash       OBJECTIVE    Vitals:   Vitals:    03/28/23 1029 03/28/23 1049   BP: 142/86 140/84   BP Location: Left arm    Patient Position: Sitting    Cuff Size: Large    Pulse: (!) 111    Resp: 18    Temp: 98 6 °F (37 °C)    TempSrc: Tympanic    SpO2: 98%    Weight: (!) 165 kg (364 lb 9 6 oz)    Height: 5' 10\" (1 778 m)            Physical Exam  Constitutional:       Appearance: He " is well-developed  HENT:      Head: Normocephalic and atraumatic  Right Ear: External ear normal       Left Ear: External ear normal    Eyes:      Conjunctiva/sclera: Conjunctivae normal       Pupils: Pupils are equal, round, and reactive to light  Cardiovascular:      Rate and Rhythm: Normal rate and regular rhythm  Heart sounds: Normal heart sounds  No murmur heard  Pulmonary:      Effort: Pulmonary effort is normal  No respiratory distress  Breath sounds: Normal breath sounds  No wheezing  Abdominal:      General: Bowel sounds are normal  There is no distension  Palpations: Abdomen is soft  Musculoskeletal:         General: Normal range of motion  Cervical back: Normal range of motion and neck supple  Skin:     General: Skin is warm and dry  Neurological:      Mental Status: He is alert and oriented to person, place, and time     Psychiatric:         Behavior: Behavior normal             Labs:        DO Leona    3/28/2023

## 2023-03-30 ENCOUNTER — OFFICE VISIT (OUTPATIENT)
Dept: FAMILY MEDICINE CLINIC | Facility: CLINIC | Age: 28
End: 2023-03-30

## 2023-03-30 VITALS
RESPIRATION RATE: 18 BRPM | WEIGHT: 315 LBS | SYSTOLIC BLOOD PRESSURE: 138 MMHG | BODY MASS INDEX: 45.1 KG/M2 | HEART RATE: 98 BPM | HEIGHT: 70 IN | DIASTOLIC BLOOD PRESSURE: 84 MMHG | TEMPERATURE: 98.2 F | OXYGEN SATURATION: 98 %

## 2023-03-30 DIAGNOSIS — H93.8X3 PRESSURE SENSATION IN BOTH EARS: ICD-10-CM

## 2023-03-30 DIAGNOSIS — H61.21 RIGHT EAR IMPACTED CERUMEN: ICD-10-CM

## 2023-03-30 DIAGNOSIS — H65.92 OTITIS MEDIA WITH EFFUSION, LEFT: Primary | ICD-10-CM

## 2023-03-30 RX ORDER — METHYLPREDNISOLONE 4 MG/1
TABLET ORAL
Qty: 1 EACH | Refills: 0 | Status: SHIPPED | OUTPATIENT
Start: 2023-03-30

## 2023-03-30 NOTE — PROGRESS NOTES
"Name: Huey Khan      : 1995      MRN: [id number]  Encounter Provider: Sally Maria PA-C  Encounter Date: 3/30/2023   Encounter department: [facility]    Assessment & Plan     1  Otitis media with effusion, left  -     methylPREDNISolone 4 MG tablet therapy pack; Use as directed on package    2  Pressure sensation in both ears  -     methylPREDNISolone 4 MG tablet therapy pack; Use as directed on package    3  Right ear impacted cerumen  -     carbamide peroxide (DEBROX) 6 5 % otic solution; Administer 5 drops to the right ear 2 (two) times a day       Reviewed EKG 2023 which is normal sinus rhythm/normal EKG      Patient to call with any questions, concerns, persistent or worsening symptoms  Patient is a scheduled to return end of May for blood pressure check  Subjective      HPI   26-year-old female here today for sick visit  Patient complaining of waxing and waning dizziness for the last couple days associated with recent URI symptoms and thick nasal mucus which he was gagging on causing him to vomit  Does have some bilateral ear pressure  Denies any fevers or chills  Denies any dyspnea, chest pain, cough or GI symptoms otherwise  No abdominal pain  Review of Systems  As per HPI  All other systems negative    Current Outpatient Medications on File Prior to Visit   Medication Sig   • Ibuprofen (ADVIL PO) Take by mouth if needed   • lisinopril (ZESTRIL) 20 mg tablet Take 1 tablet (20 mg total) by mouth daily   • naproxen sodium (ALEVE) 220 MG tablet Take 220 mg by mouth every 12 (twelve) hours as needed (Patient not taking: Reported on 3/28/2023)       Objective     /84 (BP Location: Left arm, Patient Position: Sitting, Cuff Size: Large)   Pulse 98   Temp 98 2 °F (36 8 °C) (Tympanic)   Resp 18   Ht 5' 10\" (1 778 m)   Wt (!) 166 kg (365 lb)   SpO2 98%   BMI 52 37 kg/m²     Physical Exam  Vitals and nursing note reviewed     Constitutional:  " General: He is not in acute distress  Appearance: He is obese  He is not ill-appearing, toxic-appearing or diaphoretic  HENT:      Head: Normocephalic  Right Ear: There is impacted cerumen  Left Ear: Ear canal and external ear normal  There is no impacted cerumen  Ears:      Comments: Left serous otitis without sign of infection     Nose: Congestion present  Mouth/Throat:      Mouth: Mucous membranes are moist       Comments: Nasal drip  Eyes:      General: No scleral icterus  Conjunctiva/sclera: Conjunctivae normal    Cardiovascular:      Rate and Rhythm: Normal rate and regular rhythm  Heart sounds: No murmur heard  Pulmonary:      Effort: Pulmonary effort is normal  No respiratory distress  Breath sounds: Normal breath sounds  No wheezing, rhonchi or rales  Abdominal:      General: Bowel sounds are normal       Palpations: Abdomen is soft  Tenderness: There is no abdominal tenderness  Musculoskeletal:      Cervical back: Neck supple  Right lower leg: No edema  Left lower leg: No edema  Lymphadenopathy:      Cervical: No cervical adenopathy  Neurological:      General: No focal deficit present  Mental Status: He is alert and oriented to person, place, and time  Motor: No weakness     Psychiatric:         Mood and Affect: Mood normal        Cande Roberts PA-C

## 2023-04-24 ENCOUNTER — HOSPITAL ENCOUNTER (OUTPATIENT)
Dept: SLEEP CENTER | Facility: HOSPITAL | Age: 28
Discharge: HOME | End: 2023-04-24
Attending: INTERNAL MEDICINE

## 2023-04-24 DIAGNOSIS — G47.33 OSA (OBSTRUCTIVE SLEEP APNEA): ICD-10-CM

## 2023-04-26 DIAGNOSIS — E66.01 MORBID OBESITY (HCC): ICD-10-CM

## 2023-04-26 DIAGNOSIS — G47.33 OSA (OBSTRUCTIVE SLEEP APNEA): Primary | ICD-10-CM

## 2023-04-26 NOTE — PROGRESS NOTES
Home Sleep Study Documentation    HOME STUDY DEVICE: Noxturnal no                                           Fatuma G3 yes      Pre-Sleep Home Study:    Set-up and instructions performed by: Eliot Bazzi 61    Technician performed demonstration for Patient: yes    Return demonstration performed by Patient: yes    Written instructions provided to Patient: yes    Patient signed consent form: yes        Post-Sleep Home Study:    Additional comments by Patient: NONE    Home Sleep Study Failed:no:    Failure reason: N/A    Reported or Detected: N/A    Scored by: Renuka Rocha

## 2023-05-09 ENCOUNTER — TELEPHONE (OUTPATIENT)
Dept: SLEEP CENTER | Facility: CLINIC | Age: 28
End: 2023-05-09

## 2023-05-09 NOTE — TELEPHONE ENCOUNTER
Per Dr Kim Rivera patient's home sleep study shows severe SUGAR   GETACHEW 61 [id number]  Dr Kim Rivera ordered CPAP study     Spoke to the patient advised sleep study results  Explained CPAP study   Scheduled appointment and placed patient on wait list

## 2023-05-30 ENCOUNTER — OFFICE VISIT (OUTPATIENT)
Dept: FAMILY MEDICINE CLINIC | Facility: CLINIC | Age: 28
End: 2023-05-30

## 2023-05-30 VITALS
HEART RATE: 113 BPM | WEIGHT: 315 LBS | OXYGEN SATURATION: 98 % | HEIGHT: 70 IN | SYSTOLIC BLOOD PRESSURE: 122 MMHG | RESPIRATION RATE: 18 BRPM | BODY MASS INDEX: 45.1 KG/M2 | TEMPERATURE: 97.9 F | DIASTOLIC BLOOD PRESSURE: 88 MMHG

## 2023-05-30 DIAGNOSIS — G47.33 OSA (OBSTRUCTIVE SLEEP APNEA): Primary | ICD-10-CM

## 2023-05-30 DIAGNOSIS — I10 PRIMARY HYPERTENSION: ICD-10-CM

## 2023-05-30 NOTE — PROGRESS NOTES
Javy Sorensen 1995 male MRN: [id number]    Family Medicine Follow-up Visit    ASSESSMENT/PLAN  Problem List Items Addressed This Visit        Respiratory    SUGAR (obstructive sleep apnea) - Primary     Follows with sleep medicine  Failed home sleep study awaiting in house sleep test for cpap            Cardiovascular and Mediastinum    Primary hypertension     Well controlled on medicine  Continue as prescribed             Follow up in 3 months  Get labs done as previously ordered          Future Appointments   Date Time Provider Alexandra Walker   12/27/2023  8:00  [address] [place] [address] Sleep lab [facility]  CC: Blood Pressure Check      HPI:  Javy Sorensen is a 32 y o  male who presents for blood pressure check up  HPI    Review of Systems   Constitutional: Negative for chills and fever  HENT: Negative for congestion, postnasal drip, rhinorrhea and sinus pain  Eyes: Negative for photophobia and visual disturbance  Respiratory: Negative for cough and shortness of breath  Cardiovascular: Negative for chest pain, palpitations and leg swelling  Gastrointestinal: Negative for abdominal pain, constipation, diarrhea, nausea and vomiting  Genitourinary: Negative for difficulty urinating and dysuria  Musculoskeletal: Negative for arthralgias and myalgias  Skin: Negative for rash  Neurological: Negative for dizziness and syncope  Historical Information   The patient history was reviewed as follows:    Past Medical History:   Diagnosis Date   • Malignant hyperthemia     Family history of malignant hyperthermia, pt never tested for it     Past Surgical History:   Procedure Laterality Date   • EAR SURGERY     • FRACTURE SURGERY Right     Rt foot Fx 5th metatarsal surgery 05/2017, with internal pin   • TONSILLECTOMY       No family history on file     Social History   Social History     Substance and Sexual Activity   Alcohol Use Yes    Comment: socially     Social History "    Substance and Sexual Activity   Drug Use No     Social History     Tobacco Use   Smoking Status Never   Smokeless Tobacco Former   • Quit date: 6/20/2015       Medications:     Current Outpatient Medications:   •  Ibuprofen (ADVIL PO), Take by mouth if needed, Disp: , Rfl:   •  lisinopril (ZESTRIL) 20 mg tablet, Take 1 tablet (20 mg total) by mouth daily, Disp: 90 tablet, Rfl: 3  •  methylPREDNISolone 4 MG tablet therapy pack, Use as directed on package, Disp: 1 each, Rfl: 0  •  carbamide peroxide (DEBROX) 6 5 % otic solution, Administer 5 drops to the right ear 2 (two) times a day (Patient not taking: Reported on 5/30/2023), Disp: 15 mL, Rfl: 0  •  naproxen sodium (ALEVE) 220 MG tablet, Take 220 mg by mouth every 12 (twelve) hours as needed (Patient not taking: Reported on 3/28/2023), Disp: , Rfl:   Allergies   Allergen Reactions   • [place] [Fish Oil - Food Allergy] Anaphylaxis   • Nuts - Food Allergy Throat Swelling     PISTACHIOS only   • Other      Mold and pollen   • Latex Rash       OBJECTIVE    Vitals:   Vitals:    05/30/23 1550   BP: 122/88   BP Location: Right arm   Patient Position: Sitting   Cuff Size: Large   Pulse: (!) 113   Resp: 18   Temp: 97 9 °F (36 6 °C)   TempSrc: Tympanic   SpO2: 98%   Weight: (!) 166 kg (365 lb 3 2 oz)   Height: 5' 10\" (1 778 m)           Physical Exam  Constitutional:       Appearance: He is well-developed  HENT:      Head: Normocephalic and atraumatic  Right Ear: External ear normal       Left Ear: External ear normal    Eyes:      Conjunctiva/sclera: Conjunctivae normal       Pupils: Pupils are equal, round, and reactive to light  Cardiovascular:      Rate and Rhythm: Normal rate and regular rhythm  Heart sounds: Normal heart sounds  No murmur heard  Pulmonary:      Effort: Pulmonary effort is normal  No respiratory distress  Breath sounds: Normal breath sounds  No wheezing  Abdominal:      General: Bowel sounds are normal  There is no distension      " Palpations: Abdomen is soft  Musculoskeletal:         General: Normal range of motion  Cervical back: Normal range of motion and neck supple  Skin:     General: Skin is warm and dry  Neurological:      Mental Status: He is alert and oriented to person, place, and time     Psychiatric:         Behavior: Behavior normal             Labs:        Liz Guo DO    5/30/2023

## 2023-06-19 ENCOUNTER — TELEPHONE (OUTPATIENT)
Dept: FAMILY MEDICINE CLINIC | Facility: CLINIC | Age: 28
End: 2023-06-19

## 2023-06-19 NOTE — TELEPHONE ENCOUNTER
Patient called and was wondering if there was an update on his message  Patient notified that Dr Shira Barkley has not had a chance to address the message  Patient given the option to go to Urgent Care or ER for evaluation today  Patient wanted to set up appointment for this week  Appointment set up with [facility] for evaluation

## 2023-06-19 NOTE — TELEPHONE ENCOUNTER
Finis Maroon called and stated that he is having balance issues today and also he had some fluid in one ear and wax in the other  And would like some advise as to what to do  Please advise,  Best number to reach him is [phone] and if he does not answer please leave a message      Thank You

## 2023-06-21 ENCOUNTER — OFFICE VISIT (OUTPATIENT)
Dept: FAMILY MEDICINE CLINIC | Facility: CLINIC | Age: 28
End: 2023-06-21
Payer: COMMERCIAL

## 2023-06-21 VITALS
SYSTOLIC BLOOD PRESSURE: 130 MMHG | OXYGEN SATURATION: 97 % | HEIGHT: 70 IN | HEART RATE: 100 BPM | RESPIRATION RATE: 20 BRPM | BODY MASS INDEX: 45.1 KG/M2 | DIASTOLIC BLOOD PRESSURE: 85 MMHG | TEMPERATURE: 96.8 F | WEIGHT: 315 LBS

## 2023-06-21 DIAGNOSIS — R42 DIZZY SPELLS: Primary | ICD-10-CM

## 2023-06-21 PROCEDURE — 99213 OFFICE O/P EST LOW 20 MIN: CPT | Performed by: PHYSICIAN ASSISTANT

## 2023-06-21 NOTE — PROGRESS NOTES
"Name: Layne Marcus      : 1995      MRN: [id number]  Encounter Provider: Rosario Thomas PA-C  Encounter Date: 2023   Encounter department: [facility]    Assessment & Plan     1  Dizzy spells  Comments:  Resolved with sleep and hydration    Patient call with any questions, concerns, or recurrent symptoms  To ED with any concerning or worsening symptoms       Subjective      HPI   57-year-old male here today with complaint of having dizzy spells at work couple days ago  Patient states works in a [employer] which is very hot  Possibly some mild dehydration  Patient states lasted throughout the workday  States went home drink quite a bit of Gatorade went to bed and then he he awoke with no symptoms  No recurrent symptoms since that 1 time  Denied any other symptoms  Denies any headaches, nausea, vomiting, chest pain, dyspnea, palpitations, diarrhea or recent URI or illness  Review of Systems  As per HPI  All other systems negative  Current Outpatient Medications on File Prior to Visit   Medication Sig   • Ibuprofen (ADVIL PO) Take by mouth if needed   • lisinopril (ZESTRIL) 20 mg tablet Take 1 tablet (20 mg total) by mouth daily   • carbamide peroxide (DEBROX) 6 5 % otic solution Administer 5 drops to the right ear 2 (two) times a day (Patient not taking: Reported on 2023)   • methylPREDNISolone 4 MG tablet therapy pack Use as directed on package (Patient not taking: Reported on 2023)   • naproxen sodium (ALEVE) 220 MG tablet Take 220 mg by mouth every 12 (twelve) hours as needed (Patient not taking: Reported on 3/28/2023)       Objective     /85   Pulse 100   Temp (!) 96 8 °F (36 °C) (Tympanic)   Resp 20   Ht 5' 10\" (1 778 m)   Wt (!) 164 kg (362 lb 3 2 oz)   SpO2 97%   BMI 51 97 kg/m²     Physical Exam  Vitals and nursing note reviewed  Constitutional:       General: He is not in acute distress       Appearance: He is not ill-appearing, " toxic-appearing or diaphoretic  HENT:      Head: Normocephalic  Right Ear: Tympanic membrane normal       Left Ear: Tympanic membrane normal       Nose: Nose normal       Mouth/Throat:      Mouth: Mucous membranes are moist       Pharynx: Oropharynx is clear  Eyes:      General: No scleral icterus  Extraocular Movements: Extraocular movements intact  Conjunctiva/sclera: Conjunctivae normal       Pupils: Pupils are equal, round, and reactive to light  Cardiovascular:      Rate and Rhythm: Normal rate and regular rhythm  Heart sounds: Normal heart sounds  Pulmonary:      Effort: Pulmonary effort is normal       Breath sounds: Normal breath sounds  Abdominal:      General: Bowel sounds are normal       Palpations: Abdomen is soft  Tenderness: There is no abdominal tenderness  Musculoskeletal:      Right lower leg: No edema  Left lower leg: No edema  Neurological:      General: No focal deficit present  Mental Status: He is alert and oriented to person, place, and time  Cranial Nerves: No cranial nerve deficit  Sensory: No sensory deficit  Motor: No weakness        Coordination: Coordination normal       Gait: Gait normal       Deep Tendon Reflexes: Reflexes normal    Psychiatric:         Mood and Affect: Mood normal        Jared Noriega PA-C

## 2023-08-29 ENCOUNTER — OFFICE VISIT (OUTPATIENT)
Dept: FAMILY MEDICINE CLINIC | Facility: CLINIC | Age: 28
End: 2023-08-29
Payer: COMMERCIAL

## 2023-08-29 VITALS
HEIGHT: 70 IN | TEMPERATURE: 99 F | HEART RATE: 121 BPM | BODY MASS INDEX: 45.1 KG/M2 | SYSTOLIC BLOOD PRESSURE: 136 MMHG | DIASTOLIC BLOOD PRESSURE: 84 MMHG | WEIGHT: 315 LBS | RESPIRATION RATE: 22 BRPM | OXYGEN SATURATION: 96 %

## 2023-08-29 DIAGNOSIS — G47.33 OSA (OBSTRUCTIVE SLEEP APNEA): ICD-10-CM

## 2023-08-29 DIAGNOSIS — E78.2 MIXED HYPERLIPIDEMIA: ICD-10-CM

## 2023-08-29 DIAGNOSIS — I10 PRIMARY HYPERTENSION: Primary | ICD-10-CM

## 2023-08-29 PROCEDURE — 99214 OFFICE O/P EST MOD 30 MIN: CPT | Performed by: FAMILY MEDICINE

## 2023-08-29 RX ORDER — LISINOPRIL 20 MG/1
20 TABLET ORAL DAILY
Qty: 90 TABLET | Refills: 3 | Status: SHIPPED | OUTPATIENT
Start: 2023-08-29

## 2023-08-29 NOTE — ASSESSMENT & PLAN NOTE
BMI Counseling: Body mass index is 52.97 kg/m². The BMI is above normal. Nutrition recommendations include reducing portion sizes, decreasing overall calorie intake and 3-5 servings of fruits/vegetables daily. Exercise recommendations include vigorous aerobic physical activity for 75 minutes/week.

## 2023-08-29 NOTE — ASSESSMENT & PLAN NOTE
Patient did not get his labs done as previously ordered, again encouraged patient to get labs done Patient is an 87 y/o F PMHx mild dementia, MS, breast cancer BIBA from home for abnormal labs i/s/o AMS. Patient was seen by Dr. Barry at in the home and was noticed to be altered with agitation. Outpatient work up was started and she was noted to have WBC 13.9 (with neutrophilic predominance), elevated trop I, elevated CRP (40.4), low serum iron and sent her to ED.        Per [facility] at bedside, Dr. Barry was called when Pt was noted to have . Per  at bedside, Pt has hx dementia and is more agitated than usual. Pt c/o dehydration. Denies chest pain, SOB, fever, cough, headache. Per , Pt has been constipated for the past few weeks and has had a nonproductive cough for the past week, as well as appearing weaker than usual for the past week. No leg swelling. Pt was started on Seroquel this morning.    In the ED,  Vitals: T 97.5 HR 98 /73 RR 18 95% on RA   Labs: WBC WNL, plt 435; .2    UA: + protein, + ketone, blood moderate, <5 RBC, <5 WBC   Imaging:    EKG:    CXR: wet read: no major changes from 2019 CXR, possible trace r side effusion, no focal consolidation  ED Course: s/p 1L NS    Patient is an 87 y/o F PMHx mild dementia, MS, breast cancer BIBA from home for abnormal labs i/s/o AMS. Patient was seen by Dr. Barry at in the home and was noticed to be altered with agitation. Outpatient work up was started and she was noted to have WBC 13.9 (with neutrophilic predominance), elevated trop I, elevated CRP (40.4), low serum iron and sent her to ED. Per health aide and long , patient has been increasingly agitated over the past week, and additionally not leaving bed including in order to urinate and has had a non-productive cough. She has had decreased PO intake.  Denies chest pain, SOB, fever, cough, headache. Of note patient was previously on bupropion 450 mg q daily, her former psychiatrist . Her new psychiatrist decreased and then discontinued bupropion which coincided with patients increased agitation. She was restarted on bupropion 450 mg last week, in addition to seroquel 25 mg in AM and 75 mg in PM.     In the ED,  Vitals: T 97.5 HR 98 /73 RR 18 95% on RA   Labs: WBC WNL, plt 435; .2    UA: + protein, + ketone, blood moderate, <5 RBC, <5 WBC   Imaging:    EKG: NSR with T wave inversion in V5 and V6    CXR: wet read: no major changes from 2019 CXR, possible trace r side effusion, no focal consolidation  ED Course: s/p 1L NS

## 2023-08-29 NOTE — ASSESSMENT & PLAN NOTE
Sourav's BP is doing well on lisinopril 20 mg daily  Continue as prescribed he is tolerating well   Again encouraged him to get his labs done as previously ordered

## 2023-08-29 NOTE — PROGRESS NOTES
Apryl Cobos 1995 male MRN: [id number]    Family Medicine Follow-up Visit    ASSESSMENT/PLAN  Problem List Items Addressed This Visit        Respiratory    SUGAR (obstructive sleep apnea)     He has apt pending with sleep medicine in December            Cardiovascular and Mediastinum    Primary hypertension - Primary     Sourav's BP is doing well on lisinopril 20 mg daily  Continue as prescribed he is tolerating well   Again encouraged him to get his labs done as previously ordered          Relevant Medications    lisinopril (ZESTRIL) 20 mg tablet       Other    BMI 50.0-59.9, adult ([facility])     BMI Counseling: Body mass index is 52.97 kg/m². The BMI is above normal. Nutrition recommendations include reducing portion sizes, decreasing overall calorie intake and 3-5 servings of fruits/vegetables daily. Exercise recommendations include vigorous aerobic physical activity for 75 minutes/week. Mixed hyperlipidemia     Patient did not get his labs done as previously ordered, again encouraged patient to get labs done          Follow up in 3 months or sooner if any issues             Future Appointments   Date Time Provider [address] [place] [address]   12/27/2023  8:00  [facility] [address]  Sleep lab [address] [facility]  CC: Follow-up (3 month check )      HPI:  Apryl Cobos is a 32 y.o. male who presents for check up. HPI    Review of Systems   Constitutional: Negative for chills and fever. HENT: Negative for congestion, postnasal drip, rhinorrhea and sinus pain. Eyes: Negative for photophobia and visual disturbance. Respiratory: Negative for cough and shortness of breath. Cardiovascular: Negative for chest pain, palpitations and leg swelling. Gastrointestinal: Negative for abdominal pain, constipation, diarrhea, nausea and vomiting. Genitourinary: Negative for difficulty urinating and dysuria. Musculoskeletal: Negative for arthralgias and myalgias. Skin: Negative for rash.    Neurological: Negative for dizziness and syncope. Historical Information   The patient history was reviewed as follows:    Past Medical History:   Diagnosis Date   • Malignant hyperthemia     Family history of malignant hyperthermia, pt never tested for it     Past Surgical History:   Procedure Laterality Date   • EAR SURGERY     • FRACTURE SURGERY Right     Rt foot Fx 5th metatarsal surgery 05/2017, with internal pin   • TONSILLECTOMY       No family history on file. Social History   Social History     Substance and Sexual Activity   Alcohol Use Yes    Comment: socially     Social History     Substance and Sexual Activity   Drug Use No     Social History     Tobacco Use   Smoking Status Never   Smokeless Tobacco Former   • Quit date: 6/20/2015       Medications:     Current Outpatient Medications:   •  Ibuprofen (ADVIL PO), Take by mouth if needed, Disp: , Rfl:   •  lisinopril (ZESTRIL) 20 mg tablet, Take 1 tablet (20 mg total) by mouth daily, Disp: 90 tablet, Rfl: 3  •  carbamide peroxide (DEBROX) 6.5 % otic solution, Administer 5 drops to the right ear 2 (two) times a day (Patient not taking: Reported on 5/30/2023), Disp: 15 mL, Rfl: 0  Allergies   Allergen Reactions   • [place] [Fish Oil - Food Allergy] Anaphylaxis   • Nuts - Food Allergy Throat Swelling     PISTACHIOS only   • Other      Mold and pollen   • Latex Rash       OBJECTIVE    Vitals:   Vitals:    08/29/23 1534   BP: 136/84   BP Location: Left arm   Patient Position: Sitting   Cuff Size: Large   Pulse: (!) 121   Resp: 22   Temp: 99 °F (37.2 °C)   TempSrc: Tympanic   SpO2: 96%   Weight: (!) 167 kg (369 lb 3.2 oz)   Height: 5' 10" (1.778 m)           Physical Exam  Constitutional:       Appearance: He is well-developed. HENT:      Head: Normocephalic and atraumatic. Right Ear: External ear normal.      Left Ear: External ear normal.   Eyes:      Conjunctiva/sclera: Conjunctivae normal.      Pupils: Pupils are equal, round, and reactive to light. Cardiovascular:      Rate and Rhythm: Normal rate and regular rhythm. Heart sounds: Normal heart sounds. No murmur heard. Pulmonary:      Effort: Pulmonary effort is normal. No respiratory distress. Breath sounds: Normal breath sounds. No wheezing. Abdominal:      General: Bowel sounds are normal. There is no distension. Palpations: Abdomen is soft. Musculoskeletal:         General: Normal range of motion. Cervical back: Normal range of motion and neck supple. Skin:     General: Skin is warm and dry. Neurological:      Mental Status: He is alert and oriented to person, place, and time.    Psychiatric:         Behavior: Behavior normal.            Labs:        DO Freda    8/29/2023

## 2023-11-29 DIAGNOSIS — I10 PRIMARY HYPERTENSION: ICD-10-CM

## 2023-11-29 RX ORDER — LISINOPRIL 20 MG/1
20 TABLET ORAL DAILY
Qty: 90 TABLET | Refills: 3 | Status: SHIPPED | OUTPATIENT
Start: 2023-11-29

## 2024-01-18 ENCOUNTER — OFFICE VISIT (OUTPATIENT)
Dept: FAMILY MEDICINE CLINIC | Facility: CLINIC | Age: 29
End: 2024-01-18

## 2024-01-18 VITALS
BODY MASS INDEX: 45.1 KG/M2 | OXYGEN SATURATION: 98 % | HEIGHT: 70 IN | DIASTOLIC BLOOD PRESSURE: 74 MMHG | WEIGHT: 315 LBS | HEART RATE: 106 BPM | TEMPERATURE: 97.4 F | SYSTOLIC BLOOD PRESSURE: 156 MMHG | RESPIRATION RATE: 17 BRPM

## 2024-01-18 DIAGNOSIS — J01.00 ACUTE NON-RECURRENT MAXILLARY SINUSITIS: ICD-10-CM

## 2024-01-18 DIAGNOSIS — Z59.89 DOES NOT HAVE HEALTH INSURANCE: ICD-10-CM

## 2024-01-18 DIAGNOSIS — I10 PRIMARY HYPERTENSION: Primary | ICD-10-CM

## 2024-01-18 PROBLEM — Z59.71 DOES NOT HAVE HEALTH INSURANCE: Status: ACTIVE | Noted: 2024-01-18

## 2024-01-18 PROCEDURE — 99214 OFFICE O/P EST MOD 30 MIN: CPT | Performed by: FAMILY MEDICINE

## 2024-01-18 RX ORDER — LISINOPRIL 20 MG/1
20 TABLET ORAL DAILY
Qty: 90 TABLET | Refills: 3 | Status: SHIPPED | OUTPATIENT
Start: 2024-01-18 | End: 2024-01-18 | Stop reason: SDUPTHER

## 2024-01-18 RX ORDER — AMOXICILLIN AND CLAVULANATE POTASSIUM 875; 125 MG/1; MG/1
1 TABLET, FILM COATED ORAL EVERY 12 HOURS SCHEDULED
Qty: 14 TABLET | Refills: 0 | Status: SHIPPED | OUTPATIENT
Start: 2024-01-18 | End: 2024-01-25

## 2024-01-18 RX ORDER — LISINOPRIL 30 MG/1
30 TABLET ORAL DAILY
Qty: 90 TABLET | Refills: 3 | Status: SHIPPED | OUTPATIENT
Start: 2024-01-18

## 2024-01-18 SDOH — ECONOMIC STABILITY - INCOME SECURITY: OTHER PROBLEMS RELATED TO HOUSING AND ECONOMIC CIRCUMSTANCES: Z59.89

## 2024-01-18 NOTE — PROGRESS NOTES
Sourav King 1995 male MRN: [id number]    Family Medicine Follow-up Visit    ASSESSMENT/PLAN  Problem List Items Addressed This Visit          Cardiovascular and Mediastinum    Primary hypertension - Primary     Has been taking his medication as prescribed  Reports home readings are usually in the 130s over 80s  Did not get labs done due to not having insurance, I placed a referral for social work to help with this   His BP is high today  Will increase his lisinopril to 30 mg daily continue close home monitoring           Relevant Medications    lisinopril (ZESTRIL) 30 mg tablet       Other    Does not have health insurance    Relevant Orders    Ambulatory Referral to Social Work Care Management Program       Follow up in 3 months for HTN           No future appointments.       SUBJECTIVE  CC: Hypertension (Routine follow up. )      HPI:  Sourav King is a 28 y.o. male who presents for HTN check up    HPI    Review of Systems   Constitutional:  Negative for chills and fever.   HENT:  Negative for congestion, postnasal drip, rhinorrhea and sinus pain.    Eyes:  Negative for photophobia and visual disturbance.   Respiratory:  Negative for cough and shortness of breath.    Cardiovascular:  Negative for chest pain, palpitations and leg swelling.   Gastrointestinal:  Negative for abdominal pain, constipation, diarrhea, nausea and vomiting.   Genitourinary:  Negative for difficulty urinating and dysuria.   Musculoskeletal:  Negative for arthralgias and myalgias.   Skin:  Negative for rash.   Neurological:  Negative for dizziness and syncope.       Historical Information   The patient history was reviewed as follows:    Past Medical History:   Diagnosis Date    Malignant hyperthemia     Family history of malignant hyperthermia, pt never tested for it     Past Surgical History:   Procedure Laterality Date    EAR SURGERY      FRACTURE SURGERY Right     Rt foot Fx 5th metatarsal surgery 05/2017, with internal pin     "TONSILLECTOMY       No family history on file.   Social History   Social History     Substance and Sexual Activity   Alcohol Use Yes    Comment: socially     Social History     Substance and Sexual Activity   Drug Use No     Social History     Tobacco Use   Smoking Status Never   Smokeless Tobacco Former    Quit date: 6/20/2015       Medications:     Current Outpatient Medications:     Ibuprofen (ADVIL PO), Take by mouth if needed, Disp: , Rfl:     lisinopril (ZESTRIL) 30 mg tablet, Take 1 tablet (30 mg total) by mouth daily, Disp: 90 tablet, Rfl: 3    carbamide peroxide (DEBROX) 6.5 % otic solution, Administer 5 drops to the right ear 2 (two) times a day (Patient not taking: Reported on 5/30/2023), Disp: 15 mL, Rfl: 0  Allergies   Allergen Reactions    [place] [Fish Oil - Food Allergy] Anaphylaxis    Nuts - Food Allergy Throat Swelling     PISTACHIOS only    Other      Mold and pollen    Latex Rash       OBJECTIVE    Vitals:   Vitals:    01/18/24 0854   BP: 156/74   BP Location: Left arm   Patient Position: Sitting   Cuff Size: Large   Pulse: (!) 106   Resp: 17   Temp: (!) 97.4 °F (36.3 °C)   TempSrc: Tympanic   SpO2: 98%   Weight: (!) 163 kg (358 lb 6.4 oz)   Height: 5' 10\" (1.778 m)           Physical Exam  Constitutional:       Appearance: He is well-developed.   HENT:      Head: Normocephalic and atraumatic.      Right Ear: External ear normal.      Left Ear: External ear normal.      Nose: Congestion and rhinorrhea present.   Eyes:      Conjunctiva/sclera: Conjunctivae normal.      Pupils: Pupils are equal, round, and reactive to light.   Cardiovascular:      Rate and Rhythm: Normal rate and regular rhythm.      Heart sounds: Normal heart sounds. No murmur heard.  Pulmonary:      Effort: Pulmonary effort is normal. No respiratory distress.      Breath sounds: Normal breath sounds. No wheezing.   Abdominal:      General: Bowel sounds are normal. There is no distension.      Palpations: Abdomen is soft. "   Musculoskeletal:         General: Normal range of motion.      Cervical back: Normal range of motion and neck supple.   Skin:     General: Skin is warm and dry.   Neurological:      Mental Status: He is alert and oriented to person, place, and time.   Psychiatric:         Behavior: Behavior normal.            Labs:        Roxane Alberto DO    1/18/2024

## 2024-01-18 NOTE — ASSESSMENT & PLAN NOTE
Has been taking his medication as prescribed  Reports home readings are usually in the 130s over 80s  Did not get labs done due to not having insurance, I placed a referral for social work to help with this   His BP is high today  Will increase his lisinopril to 30 mg daily continue close home monitoring

## 2024-01-19 ENCOUNTER — PATIENT OUTREACH (OUTPATIENT)
Dept: FAMILY MEDICINE CLINIC | Facility: CLINIC | Age: 29
End: 2024-01-19

## 2024-01-19 NOTE — PROGRESS NOTES
LEANNE WEI received a referral from patient's PCP due to patient not having insurance. LEANNE WEI reviewed patient's chart and called patient ([phone]). Patient answered and LEANNE WEI introduced LEANNE WEI role and reason for calling.     Patient stated that he has changed jobs and now is unemployed and he no longer has insurance. He has a family of 5 (him, his wife and three children) all who are uninsured. He stated that they live with their in laws. His wife is currently working part time for his [employer]. They are currently caught up on all bills. They have a car as well. LEANNE WEI discussed applying for medicaid. However patient stated that he would like to discuss with his wife first. His wife and  children do not go to a [facility]'s PCP office. LEANNE WEI will follow up with patient next week regarding.

## 2024-01-26 ENCOUNTER — PATIENT OUTREACH (OUTPATIENT)
Dept: FAMILY MEDICINE CLINIC | Facility: CLINIC | Age: 29
End: 2024-01-26

## 2024-01-26 NOTE — PROGRESS NOTES
LEANNE WEI called patient ([phone]) to follow up regarding if he would like to apply for medicaid or not. Patient did not answer, LEANNE WEI left a message including LEANNE WEI contact information and requested a call back. LEANNE WEI will attempt to outreach again at a later date.

## 2024-02-02 ENCOUNTER — PATIENT OUTREACH (OUTPATIENT)
Dept: FAMILY MEDICINE CLINIC | Facility: CLINIC | Age: 29
End: 2024-02-02

## 2024-02-02 NOTE — PROGRESS NOTES
LEANNE WEI received a call/ voicemail from patient ([phone]). Patient stated he is working and was told that he will be eligible for health care insurance after 90 days of working. LEANNE WEI will close. Please re consult LEANNE WEI as needed.

## 2024-08-27 ENCOUNTER — OFFICE VISIT (OUTPATIENT)
Dept: FAMILY MEDICINE CLINIC | Facility: CLINIC | Age: 29
End: 2024-08-27
Payer: COMMERCIAL

## 2024-08-27 VITALS
TEMPERATURE: 99.6 F | OXYGEN SATURATION: 98 % | WEIGHT: 315 LBS | HEIGHT: 70 IN | HEART RATE: 111 BPM | BODY MASS INDEX: 45.1 KG/M2 | SYSTOLIC BLOOD PRESSURE: 142 MMHG | DIASTOLIC BLOOD PRESSURE: 94 MMHG | RESPIRATION RATE: 20 BRPM

## 2024-08-27 DIAGNOSIS — H91.91 HEARING LOSS OF RIGHT EAR, UNSPECIFIED HEARING LOSS TYPE: Primary | ICD-10-CM

## 2024-08-27 DIAGNOSIS — E66.01 MORBID OBESITY WITH BMI OF 50.0-59.9, ADULT (HCC): ICD-10-CM

## 2024-08-27 DIAGNOSIS — R41.3 MEMORY LOSS, SHORT TERM: ICD-10-CM

## 2024-08-27 DIAGNOSIS — R41.840 ATTENTION AND CONCENTRATION DEFICIT: ICD-10-CM

## 2024-08-27 DIAGNOSIS — Z00.00 ANNUAL PHYSICAL EXAM: Primary | ICD-10-CM

## 2024-08-27 DIAGNOSIS — I10 PRIMARY HYPERTENSION: ICD-10-CM

## 2024-08-27 DIAGNOSIS — H91.91 HEARING LOSS OF RIGHT EAR, UNSPECIFIED HEARING LOSS TYPE: ICD-10-CM

## 2024-08-27 DIAGNOSIS — H93.13 TINNITUS OF BOTH EARS: ICD-10-CM

## 2024-08-27 DIAGNOSIS — G47.33 OSA (OBSTRUCTIVE SLEEP APNEA): ICD-10-CM

## 2024-08-27 PROCEDURE — 99395 PREV VISIT EST AGE 18-39: CPT

## 2024-08-27 RX ORDER — LISINOPRIL 40 MG/1
40 TABLET ORAL DAILY
Qty: 90 TABLET | Refills: 1 | Status: SHIPPED | OUTPATIENT
Start: 2024-08-27

## 2024-08-27 NOTE — ASSESSMENT & PLAN NOTE
Body mass index is 51.63 kg/m².  Discussed avoidance of fatty, fried foods, red meat, alcohol, and saturated fats. Discussed healthy dietary choices of lean meats, fish, healthy fats, fruits, vegetables, and whole grains. Discussed recommendations for increased physical activity and weight loss. The patient will obtain the lab work ordered today. The patient will be notified of results when available and also any further recommendations. The patient will begin making healthy lifestyle changes as recommended.

## 2024-08-27 NOTE — ASSESSMENT & PLAN NOTE
The patient reports exposure to loud noises through work and during hunting season. He has noticed a decrease in his right ear hearing. Encouraged the use of ear protection during loud activities. A referral to audiology was placed today per patient request.

## 2024-08-27 NOTE — ASSESSMENT & PLAN NOTE
Saw sleep specialist and had home study done with recommendations for CPAP machine. He did not have insurance at the time and did not pursue this any further. He now has insurance and would like to go back to sleep medicine. A referral to sleep medicine was placed today.

## 2024-08-27 NOTE — ASSESSMENT & PLAN NOTE
His blood pressure is elevated today. He reports he often forgets to take his lisinopril. He takes it 4-5x/week. He reports he cannot find his BP cuff at home. Discussed the importance of medication adherence and encouraged him to find the BP cuff or purchase a new one. He will continue to make changes to his diet to include heart healthy items. The patient will obtain the lab work ordered today. The patient will be notified of results when available and also any further recommendations. We will increase his lisinopril to 40 mg today. Continue home monitoring.

## 2024-08-27 NOTE — PATIENT INSTRUCTIONS
"Please get the lab work ordered today done 1 week prior to your next appointment so we can discuss them at your visit.    Patient Education     Routine physical for adults   The Basics   Written by the doctors and editors at [facility]   What is a physical? -- A physical is a routine visit, or \"check-up,\" with your doctor. You might also hear it called a \"wellness visit\" or \"preventive visit.\"  During each visit, the doctor will:   Ask about your physical and mental health   Ask about your habits, behaviors, and lifestyle   Do an exam   Give you vaccines if needed   Talk to you about any medicines you take   Give advice about your health   Answer your questions  Getting regular check-ups is an important part of taking care of your health. It can help your doctor find and treat any problems you have. But it's also important for preventing health problems.  A routine physical is different from a \"sick visit.\" A sick visit is when you see a doctor because of a health concern or problem. Since physicals are scheduled ahead of time, you can think about what you want to ask the doctor.  How often should I get a physical? -- It depends on your age and health. In general, for people age 21 years and older:   If you are younger than 50 years, you might be able to get a physical every 3 years.   If you are 50 years or older, your doctor might recommend a physical every year.  If you have an ongoing health condition, like diabetes or high blood pressure, your doctor will probably want to see you more often.  What happens during a physical? -- In general, each visit will include:   Physical exam - The doctor or nurse will check your height, weight, heart rate, and blood pressure. They will also look at your eyes and ears. They will ask about how you are feeling and whether you have any symptoms that bother you.   Medicines - It's a good idea to bring a list of all the medicines you take to each doctor visit. Your doctor will talk " "to you about your medicines and answer any questions. Tell them if you are having any side effects that bother you. You should also tell them if you are having trouble paying for any of your medicines.   Habits and behaviors - This includes:   Your diet   Your exercise habits   Whether you smoke, drink alcohol, or use drugs   Whether you are sexually active   Whether you feel safe at home  Your doctor will talk to you about things you can do to improve your health and lower your risk of health problems. They will also offer help and support. For example, if you want to quit smoking, they can give you advice and might prescribe medicines. If you want to improve your diet or get more physical activity, they can help you with this, too.   Lab tests, if needed - The tests you get will depend on your age and situation. For example, your doctor might want to check your:   Cholesterol   Blood sugar   Iron level   Vaccines - The recommended vaccines will depend on your age, health, and what vaccines you already had. Vaccines are very important because they can prevent certain serious or deadly infections.   Discussion of screening - \"Screening\" means checking for diseases or other health problems before they cause symptoms. Your doctor can recommend screening based on your age, risk, and preferences. This might include tests to check for:   Cancer, such as breast, prostate, cervical, ovarian, colorectal, prostate, lung, or skin cancer   Sexually transmitted infections, such as chlamydia and gonorrhea   Mental health conditions like depression and anxiety  Your doctor will talk to you about the different types of screening tests. They can help you decide which screenings to have. They can also explain what the results might mean.   Answering questions - The physical is a good time to ask the doctor or nurse questions about your health. If needed, they can refer you to other doctors or specialists, too.  Adults older than 65 " years often need other care, too. As you get older, your doctor will talk to you about:   How to prevent falling at home   Hearing or vision tests   Memory testing   How to take your medicines safely   Making sure that you have the help and support you need at home  All topics are updated as new evidence becomes available and our peer review process is complete.  This topic retrieved from [employer] on: May 02, 2024.  Topic 631171 Version 1.0  Release: 32.4.3 - C32.122  © 2024 UpToDate, Inc. and/or its affiliates. All rights reserved.  Consumer Information Use and Disclaimer   Disclaimer: This generalized information is a limited summary of diagnosis, treatment, and/or medication information. It is not meant to be comprehensive and should be used as a tool to help the user understand and/or assess potential diagnostic and treatment options. It does NOT include all information about conditions, treatments, medications, side effects, or risks that may apply to a specific patient. It is not intended to be medical advice or a substitute for the medical advice, diagnosis, or treatment of a health care provider based on the health care provider's examination and assessment of a patient's specific and unique circumstances. Patients must speak with a health care provider for complete information about their health, medical questions, and treatment options, including any risks or benefits regarding use of medications. This information does not endorse any treatments or medications as safe, effective, or approved for treating a specific patient. UpToDate, Inc. and its affiliates disclaim any warranty or liability relating to this information or the use thereof.The use of this information is governed by the Terms of Use, available at https://www.wolterskluwer.com/en/know/clinical-effectiveness-terms. 2024© UpToDate, Inc. and its affiliates and/or licensors. All rights reserved.  Copyright   © 2024 UpToDate, Inc. and/or its  "affiliates. All rights reserved.    Patient Education     Weight Loss Diet   About this topic   There are many \"trendy\" weight loss diets that are popular today. Many of these diets can end up being more harmful than helpful. The healthiest way to lose weight is to burn more calories than you eat.  A weight loss diet should help you have a healthy view of eating. It is NOT healthy to stop eating to try and lose weight. A good diet plan will help you cut down your food intake and make healthy choices.  A healthy weight loss goal is 1 to 2 pounds (0.5 to 1 kg) per week. Reducing calories in your diet, burning calories through exercise, or both can help you lose weight. Combining a healthy diet with regular physical activity can help you get the best results.  To cut calories in your diet you can:  Switch from whole milk to 1% or skim milk.  Switch from regular cheese to low-fat or fat-free cheese.  Use healthier condiment choices:  Fat-free or low-fat sour cream or salad dressings  Spray butter  Diet syrups or jellies over regular  Try frozen yogurt as a dessert rather than eating ice cream.  Skip the chips. Snack on carrots, vegetables, or fruit. If chips are a favorite of yours, try the baked style and watch portion size.  Eat grilled, roasted, boiled, broiled, or baked meats. Avoid deep-frying. Choose skinless poultry, lean red meat, lean cuts of pork, and fish for good protein sources.  Try flavored no-calorie koroma. Do not drink soda and juices that have many calories.  Choose fruit instead of sweets.  General   Eating smaller meals more often may be helpful. This will keep you from overeating at your next meal. Also, eating meals slowly helps you feel full faster.  If eating 3 meals is a part of your lifestyle, choose more lean proteins and higher fiber foods to fill you up at each meal.  Do not skip meals. Most often if you skip a meal, you eat too much at the next meal.  Eat smaller portions. Use a smaller " plate or bowl for meals, and when you are eating out, eat half and take the rest home.  Plan ahead. Plan your meals and grocery list before going to the store. Planning will keep you from getting meals from restaurants.  Do not go to the grocery store hungry. You are more likely to buy snacks that are not good for you.  Portion out snacks. When you are having a snack, instead of grabbing the whole bag, portion a small amount out to give yourself a stopping point.  Drink water before and after your meals to help fill you up without the calories.  When eating starchy foods, choose whole-grain products. These have a lot of fiber which will make you feel full. Fiber also helps lower cholesterol and helps with bowel function.  If you need a helpful start, ask your doctor to send you to a dietitian for weight loss help.         What will the results be?   Losing excess weight will make your whole body healthier. You will have more energy for your daily activities and lower your risk for health problems.  What lifestyle changes are needed?   Stay active. Eating healthy is not always enough to lose weight. Burning calories by exercising is a big part of weight loss.  What foods are good to eat?   The key is to watch your portion sizes. It is best to choose foods that are lower in fat and calories.  Choose lean meats:  Boneless, skinless chicken breast  Pork loin  90% lean beef  Lean turkey meat  Fresh fish (not fried)  Choose low-fat dairy products:  1% or skim milk  Spray butter or margarine  Low-fat or fat-free cheese  Frozen yogurt or low-calorie ice cream  Choose fresh fruits, vegetables, beans and lentils, and whole wheat products more often.  Choose water to drink more often. Drink diet or no-calorie beverages when you want something other than water. Aim to get your calories from the foods you eat.  Choose smart snacks:  Fruits  Vegetables  Low-fat or nonfat yogurt  Low-fat or no-fat cheese, such as cottage  cheese  Unsalted nuts  Hard-boiled egg  Hummus  Guacamole  Natural peanut butter  Popcorn with no butter ? use pepper, garlic, or another spice to taste  Whole grain crackers  What foods should be limited or avoided?   Limit high-fat, high-sodium, and high-calorie foods like:  Fried foods  Processed meats  Whole-fat dairy products  Candy, cookies, chips, pastries  Sausage, diaz, any full-fat meats  Soda, juice  Beer, wine, and mixed drinks (alcohol)  Will there be any other care needed?   What do I do first before trying to lose weight?  Talk to your doctor and dietitian to see if you need to lose weight. Work with them to set your weight loss goals.  If you have a chronic illness, such as high blood sugar or high blood pressure, ask a doctor or dietitian what diet and exercise is right for you.  Ask your doctor about how much you are able to exercise and what type of exercise is good for you.  Helpful tips   Keep a food journal to help keep you on track.  Join a support group.  Tips for burning calories:  If your workplace is near your house, choose to walk or bike to work instead of driving.  Take 20-minute walks each day. Walk around during your lunch break. You will not only burn calories, but will raise your energy for the rest of the day.  Take the stairs over the elevator.  Join a gym or exercise class with a friend.  Try to exercise 30 minutes a day for overall health. Three 10-minute sessions work too. Aim for 60 to 90 minutes a day to lose weight.  Drink lots of water before, during, and after exercise.  Last Reviewed Date   2021-06-24  Consumer Information Use and Disclaimer   This generalized information is a limited summary of diagnosis, treatment, and/or medication information. It is not meant to be comprehensive and should be used as a tool to help the user understand and/or assess potential diagnostic and treatment options. It does NOT include all information about conditions, treatments,  medications, side effects, or risks that may apply to a specific patient. It is not intended to be medical advice or a substitute for the medical advice, diagnosis, or treatment of a health care provider based on the health care provider's examination and assessment of a patient’s specific and unique circumstances. Patients must speak with a health care provider for complete information about their health, medical questions, and treatment options, including any risks or benefits regarding use of medications. This information does not endorse any treatments or medications as safe, effective, or approved for treating a specific patient. UpToDate, Inc. and its affiliates disclaim any warranty or liability relating to this information or the use thereof. The use of this information is governed by the Terms of Use, available at https://www.woltersNCPC Enterprises LLCuwer.com/en/know/clinical-effectiveness-terms   Copyright   Copyright © 2024 UpToDate, Inc. and its affiliates and/or licensors. All rights reserved.

## 2024-08-27 NOTE — ASSESSMENT & PLAN NOTE
"The patient reports a history of multiple concussions and a \"mini-stroke\" 2 years ago. 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 He reports occasional headaches. This problem could be a result of any of these previous problems.   "

## 2024-08-27 NOTE — PROGRESS NOTES
Adult Annual Physical  Name: Sourav King      : 1995      MRN: [id number]  Encounter Provider: VASU Duffy  Encounter Date: 2024   Encounter department: [facility]    Assessment & Plan   1. Annual physical exam  2. Morbid obesity with BMI of 50.0-59.9, adult ([facility])  Assessment & Plan:  Body mass index is 51.63 kg/m².  Discussed avoidance of fatty, fried foods, red meat, alcohol, and saturated fats. Discussed healthy dietary choices of lean meats, fish, healthy fats, fruits, vegetables, and whole grains. Discussed recommendations for increased physical activity and weight loss. The patient will obtain the lab work ordered today. The patient will be notified of results when available and also any further recommendations. The patient will begin making healthy lifestyle changes as recommended.    Orders:  -     Lipid panel; Future; Expected date: 2024  -     Hemoglobin A1C; Future; Expected date: 2024  -     CBC and differential; Future; Expected date: 2024  -     Comprehensive metabolic panel; Future; Expected date: 2024  -     TSH, 3rd generation with Free T4 reflex; Future; Expected date: 2024  -     UA (URINE) with reflex to Scope; Future  -     Lipid panel  -     Hemoglobin A1C  -     CBC and differential  -     Comprehensive metabolic panel  -     TSH, 3rd generation with Free T4 reflex  -     UA (URINE) with reflex to Scope  3. Primary hypertension  Assessment & Plan:  His blood pressure is elevated today. He reports he often forgets to take his lisinopril. He takes it 4-5x/week. He reports he cannot find his BP cuff at home. Discussed the importance of medication adherence and encouraged him to find the BP cuff or purchase a new one. He will continue to make changes to his diet to include heart healthy items. The patient will obtain the lab work ordered today. The patient will be notified of results when available and also any  "further recommendations. We will increase his lisinopril to 40 mg today. Continue home monitoring.    Orders:  -     Lipid panel; Future; Expected date: 08/27/2024  -     CBC and differential; Future; Expected date: 08/27/2024  -     Comprehensive metabolic panel; Future; Expected date: 08/27/2024  -     TSH, 3rd generation with Free T4 reflex; Future; Expected date: 08/27/2024  -     UA (URINE) with reflex to Scope; Future  -     lisinopril (ZESTRIL) 40 mg tablet; Take 1 tablet (40 mg total) by mouth daily  -     Lipid panel  -     CBC and differential  -     Comprehensive metabolic panel  -     TSH, 3rd generation with Free T4 reflex  -     UA (URINE) with reflex to Scope  4. Memory loss, short term  Assessment & Plan:  The patient reports a history of multiple concussions and a \"mini-stroke\" 2 years ago. 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 He reports occasional headaches. This problem could be a result of any of these previous problems.   Orders:  -     MRI brain w wo contrast; Future; Expected date: 08/27/2024  5. Tinnitus of both ears  -     Ambulatory Referral to Audiology; Future  6. 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 He has noticed a decrease in his right ear hearing. Encouraged the use of ear protection during loud activities. A referral to audiology was placed today per patient request.  Orders:  -     Ambulatory Referral to Audiology; Future  7. Attention and concentration deficit  Assessment & Plan:  The patient reports inability to focus and has difficulty with numbers. He reports he often switches numbers causing problems at work. A referral to psych services was placed today.  Orders:  -     Ambulatory referral to Psych Services; Future  8. SUGAR (obstructive sleep apnea)  Assessment & Plan:  Saw sleep specialist and had home study done with recommendations for CPAP " "machine. He did not have insurance at the time and did not pursue this any further. He now has insurance and would like to go back to sleep medicine. A referral to sleep medicine was placed today.  Orders:  -     Ambulatory Referral to Sleep Medicine; Future    Follow up in 3 months    Immunizations and preventive care screenings were discussed with patient today. Appropriate education was printed on patient's after visit summary.    Counseling:  Alcohol/drug use: discussed moderation in alcohol intake, the recommendations for healthy alcohol use, and avoidance of illicit drug use.  Dental Health: discussed importance of regular tooth brushing, flossing, and dental visits.  Injury prevention: discussed safety/seat belts, safety helmets, smoke detectors, carbon dioxide detectors, and smoking near bedding or upholstery.  Sexual health: discussed sexually transmitted diseases, partner selection, use of condoms, avoidance of unintended pregnancy, and contraceptive alternatives.  Exercise: the importance of regular exercise/physical activity was discussed. Recommend exercise 3-5 times per week for at least 30 minutes.       Depression Screening and Follow-up Plan: Patient was screened for depression during today's encounter. They screened negative with a PHQ-2 score of 0.        History of Present Illness     Adult Annual Physical:  Patient presents for annual physical. Sourav King is a 28 y.o. year old male who presents today for an annual physical and concerns include difficulty with memory loss and \"numbers dyslexia\" as he often inverts numbers.  .     Diet and Physical Activity:  - Diet/Nutrition: poor diet.  - Exercise: walking and 5-7 times a week on average.    Depression Screening:  - PHQ-2 Score: 0    General Health:  - Sleep: sleeps well and 7-8 hours of sleep on average.  - Hearing: normal hearing bilateral ears and tinnitus. He would like to go get his hearing checked  - Vision: most recent eye exam > 1 year " "ago and wears glasses.  - Dental: no dental visits for > 1 year and brushes teeth once daily.     Health:  - History of STDs: no.   - Urinary symptoms: none.     Advanced Care Planning:  - Has an advanced directive?: no    - Has a durable medical POA?: no    - ACP document given to patient?: no      Review of Systems   Constitutional: Negative.  Negative for chills, fatigue and fever.   HENT:  Positive for hearing loss (right ear) and tinnitus. Negative for congestion, ear pain, rhinorrhea and sore throat.    Eyes: Negative.  Negative for pain and visual disturbance.   Respiratory: Negative.  Negative for cough and shortness of breath.    Cardiovascular: Negative.  Negative for chest pain, palpitations and leg swelling.   Gastrointestinal:  Negative for abdominal pain, constipation, diarrhea, nausea and vomiting.   Endocrine: Negative.    Genitourinary: Negative.  Negative for dysuria, frequency and urgency.   Musculoskeletal: Negative.  Negative for back pain and myalgias.   Skin: Negative.  Negative for rash.   Allergic/Immunologic: Negative.    Neurological:  Positive for headaches. Negative for dizziness, weakness and light-headedness.   Hematological: Negative.    Psychiatric/Behavioral:  Positive for decreased concentration. Negative for agitation.          Objective     /94 (BP Location: Left arm, Patient Position: Sitting, Cuff Size: Large)   Pulse (!) 111   Temp 99.6 °F (37.6 °C) (Tympanic)   Resp 20   Ht 5' 10\" (1.778 m)   Wt (!) 163 kg (359 lb 12.8 oz)   SpO2 98%   BMI 51.63 kg/m²     Physical Exam  Vitals and nursing note reviewed.   Constitutional:       General: He is not in acute distress.     Appearance: Normal appearance. He is morbidly obese. He is not ill-appearing.   HENT:      Head: Normocephalic and atraumatic.      Right Ear: Tympanic membrane, ear canal and external ear normal.      Left Ear: Tympanic membrane, ear canal and external ear normal.      Nose: Nose normal. No " congestion.      Mouth/Throat:      Mouth: Mucous membranes are moist.      Pharynx: Oropharynx is clear. No posterior oropharyngeal erythema.   Eyes:      Extraocular Movements: Extraocular movements intact.      Conjunctiva/sclera: Conjunctivae normal.      Pupils: Pupils are equal, round, and reactive to light.   Cardiovascular:      Rate and Rhythm: Normal rate and regular rhythm.      Heart sounds: Normal heart sounds. No murmur heard.  Pulmonary:      Effort: Pulmonary effort is normal. No respiratory distress.      Breath sounds: Normal breath sounds. No wheezing.   Abdominal:      General: Abdomen is flat. Bowel sounds are normal.      Palpations: Abdomen is soft.      Tenderness: There is no abdominal tenderness.   Musculoskeletal:         General: No tenderness. Normal range of motion.      Cervical back: Normal range of motion and neck supple. No tenderness.   Lymphadenopathy:      Cervical: No cervical adenopathy.   Skin:     General: Skin is warm and dry.      Capillary Refill: Capillary refill takes less than 2 seconds.      Findings: No bruising or rash.   Neurological:      General: No focal deficit present.      Mental Status: He is alert and oriented to person, place, and time.   Psychiatric:         Mood and Affect: Mood normal.         Behavior: Behavior normal.

## 2024-08-27 NOTE — ASSESSMENT & PLAN NOTE
The patient reports inability to focus and has difficulty with numbers. He reports he often switches numbers causing problems at work. A referral to psych services was placed today.

## 2024-08-28 ENCOUNTER — TELEPHONE (OUTPATIENT)
Age: 29
End: 2024-08-28

## 2024-08-28 NOTE — TELEPHONE ENCOUNTER
Contacted Pt. in regards to ROUTINE Referral, LVM to contact [phone] to discuss services needed at this time in order to be added to proper wait list.

## 2024-08-29 ENCOUNTER — OFFICE VISIT (OUTPATIENT)
Dept: SLEEP CENTER | Facility: CLINIC | Age: 29
End: 2024-08-29
Payer: COMMERCIAL

## 2024-08-29 VITALS
BODY MASS INDEX: 45.1 KG/M2 | HEIGHT: 70 IN | WEIGHT: 315 LBS | DIASTOLIC BLOOD PRESSURE: 88 MMHG | HEART RATE: 99 BPM | SYSTOLIC BLOOD PRESSURE: 138 MMHG

## 2024-08-29 DIAGNOSIS — G47.26 SHIFT WORK SLEEP DISORDER: ICD-10-CM

## 2024-08-29 DIAGNOSIS — E66.01 CLASS 3 SEVERE OBESITY DUE TO EXCESS CALORIES WITH SERIOUS COMORBIDITY AND BODY MASS INDEX (BMI) OF 50.0 TO 59.9 IN ADULT (HCC): ICD-10-CM

## 2024-08-29 DIAGNOSIS — G47.33 OSA (OBSTRUCTIVE SLEEP APNEA): Primary | ICD-10-CM

## 2024-08-29 PROCEDURE — 99214 OFFICE O/P EST MOD 30 MIN: CPT

## 2024-08-29 NOTE — PROGRESS NOTES
[facility]  Sleep Medicine New Patient Evaluation    PATIENT NAME: Sourav King  DATE OF SERVICE: August 29, 2024    CONSULTING PROVIDER:  VASU Edwards  [address]   [address] [place] [id number]    ASSESSMENT/PLAN:  Sourav King is a 28 y.o. male with PMHx of HTN, HLD, TIA, SUGAR and obesity who presents for sleep evaluation.    SUGAR (Obstructive Sleep Apnea)  - The patient has a history of severe SUGAR diagnosed on HSAT in 4/2023 (GETACHEW 61.0, supine GETACHEW 72.5, O2 jli 53%, TST <90% 74.1 min), but was never started on treatment due to loss of insurance.  - Given the severity of his prior sleep apnea we will plan for split-night study.  - Discussed with the patient the possible diagnosis, causes and conditions associated with SDB along with potential treatments.  The patient is amenable to discussing results/treatment plan via phone/MyChart.  - Encouraged healthy lifestyle with adequate sleep (7-9 hours per night), healthy balanced diet and routine exercise.  - Follow-up 31 to 90 days after receiving device.  -     Ambulatory Referral to Sleep Medicine  -     Split Study w/ Transcutaneous; Future    Shift Work Sleep Disorder  - The patient's sleep/wake cycle on workdays is not consolidated and he struggles with sleepiness during his normal waking hours.  Suspect some of his symptoms are related to his untreated sleep apnea, but does appear he has SWSD as well.  - Recommend going to sleep immediately after coming home from work, and overlapping sleep hours between workdays and days off instead of flipping to a completely nocturnal sleep schedule.  - Will further assess the severity of his symptoms once he is on treatment for sleep apnea.    Class 3 Severe Obesity  - Weight loss is strongly recommended, offered referral to weight management, but the patient declined at this time.  -     Split Study w/ Transcutaneous; Future    Thank you for allowing me to participate in the care  of your patient.  If there are any questions regarding evaluation please feel free to reach out.   ________________________________________________________________________________________________    HPI: Sourav King is a 28 y.o. male with PMHx of HTN, HLD, TIA, SUGAR and obesity who presents for sleep evaluation.  Sleep-Related History: HSAT in 4/2023 which showed severe SUGAR, he has never been on treatment.    The patient was referred by his PCP due to history of sleep apnea that he has never had treated.  He was previously seen in the [facility] sleep office in 3/2023 with multiple symptoms concerning for sleep apnea and was sent for an HSAT which showed severe SUGAR.  He was then recommended for PAP titration, but he lost his insurance and was unable to go for the study.  He notes that he continues to snore and will sleep prone as this is the only way he is comfortable and snores less.  His wife is also noticed witnessed apneas and gasping/choking in his sleep.  He often wakes with a dry mouth unrefreshed and occasionally has headaches after naps.  He denies any symptoms consistent with SP, HH, cataplexy, RLS and parasomnias.    ESS: Total score: 15/24  Greater or equal to 10 is positive for excessive daytime sleepiness  Pertinent Meds: None    Sleep-Wake Schedule:  He is self-described as a night person.  Bedtime: will initially come home and sleep from 1077-4585 then later in the day, 3035-6006 (more recently 1400/1430 - 2000 due to changes at work)  Wake Time: 2200  Difficulty Falling Asleep: Generally takes 15-30 min to fall asleep once he puts his phone down, but can be on the phone for a while  Avg Number of Awakenings: 3x  Cause of Awakenings: tossing/turning, childcare  Weekend Sleep Schedule: 7452-1009/0800  Naps: 3x a week for 1 hour, naps are mildly refreshing    Avg TST per 24 hours: 6-7 hours    Sleep-Related Details:  Preferred Sleep Position: prone -- snores less in this position  SDB Symptoms:  snoring, witnessed apneas, gasping/choking, dry mouth/nose, and waking unrefreshed -- occasionally he has headaches after naps  Bruxism: Yes, he is not currently wearing a nightguard  Nocturnal GERD: No  Nocturnal Palpitations: No  Sleep-Related Hallucinations: No  Sleep Paralysis: No  Cataplexy: No     He denies having an urge to move the legs in the evening (when resting) nor uncomfortable and/or unpleasant sensations in the legs. He has not been told that he kicks his legs during sleep.     He denies any parasomnia activity.  He does report somniloquy at times.    Wake-Related Details  Daytime Sleepiness: Yes, on the 2nd half of his shift he struggles with sleepiness.    Work Schedule: [employer], 5811-9521  Drowsy Driving: No  Caffeine: Yes, 1 cup of coffee on days off  Alcohol Use: Yes, 2x a week, 1 glass of bourbon  Substance Use: No  Tobacco Use: Yes, he smokes 1/2 cigar a day and has been doing this for the past month, previously chewed tobacco and denies vaping/e-cigs and cigarettes.  Weight Change: he has gained ~9lbs over the past year, he declined weight management referral    He does have difficulty with memory and concentration.    Past Treatments:  None    Prior Sleep Studies:  HSAT -- 4/24/2023 (Wt 365.0 lbs)  GETACHEW - 61.0  Sup GETACHEW - 72.5  O2 Luis - 53.0%  TST < 90% - 74.1 min    Other Relevant Labs and Studies:  None    Past Medical History:   Diagnosis Date    Malignant hyperthemia     Family history of malignant hyperthermia, pt never tested for it     Past Surgical History:   Procedure Laterality Date    EAR SURGERY      FRACTURE SURGERY Right     Rt foot Fx 5th metatarsal surgery 05/2017, with internal pin    TONSILLECTOMY       Patient Active Problem List   Diagnosis    Snoring    Witnessed episode of apnea    Lactose intolerance    Frequent bowel movements    Morbid obesity with BMI of 50.0-59.9, adult (HCC)    Generalized abdominal pain    Primary hypertension    Attention and  "concentration deficit    Mixed hyperlipidemia    IFG (impaired fasting glucose)    Chronic bilateral low back pain without sciatica    Abnormal CBC    SUGAR (obstructive sleep apnea)    Does not have health insurance    Memory loss, short term    Hearing loss of right ear     Allergies as of 08/29/2024 - Reviewed 08/29/2024   Allergen Reaction Noted    [place] [fish oil - food allergy] Anaphylaxis 05/22/2019    Nuts - food allergy Throat Swelling 12/29/2022    Other  05/19/2017    Latex Rash 09/20/2017     REVIEW OF SYSTEMS:  Review of Systems  10-point system review completed, all of which are negative except as mentioned above.    CURRENT MEDICATIONS:  Current Outpatient Medications   Medication Instructions    carbamide peroxide (DEBROX) 6.5 % otic solution 5 drops, Right Ear, 2 times daily    Ibuprofen (ADVIL PO) As needed    lisinopril (ZESTRIL) 40 mg, Oral, Daily     SOCIAL HISTORY:  Social History     Tobacco Use    Smoking status: Never    Smokeless tobacco: Former     Quit date: 6/20/2015   Vaping Use    Vaping status: Never Used   Substance Use Topics    Alcohol use: Yes     Comment: socially    Drug use: No     FAMILY HISTORY:  History reviewed. No pertinent family history.  Family History of Sleep Disorders: grandmother and father have SUGAR    PHYSICAL EXAMINATION:  Vital Signs:  Ht 5' 10\" (1.778 m)   Wt (!) 163 kg (359 lb)   BMI 51.51 kg/m²   Body mass index is 51.51 kg/m².    Constitutional: NAD, well appearing, obese   Mental Status: AAOx3  Neck Circumference: Neck Circumference: 18.5 inches  Skin: Warm, dry, no rashes noted   Eyes: PERRL, normal conjunctiva  ENT: Nasal congestion absent, nasal valve incompetence absent.  Posterior Airspace:   Kerr Tongue Position: 4  Retrognathia: absent  Overbite: mild  High Arched Palate: absent  Tongue Scalloping/Ridging: present  Chest: RRR, +S1/S2, CTA B/L, no W/R/R, no M/R/G   Abdomen: Soft, NT/ND  Extremities: No digital clubbing or pedal edema    I have " "spent a total time of 40 minutes on 08/29/24 in caring for this patient including Instructions for management, Patient and family education, Importance of tx compliance, Counseling / Coordination of care, Documenting in the medical record, Reviewing / ordering tests, medicine, procedures  , and Obtaining or reviewing history  .    Aaliyah Ivan MD  Pulmonary-Critical Care and Sleep Medicine  08/29/24    Portions of the record may have been created with voice recognition software. Occasional wrong word or \"sound a like\" substitutions may have occurred due to the inherent limitations of voice recognition software. Please read the chart carefully and recognize, using context, where substitutions have occurred.   "

## 2024-08-29 NOTE — PATIENT INSTRUCTIONS
- An in lab sleep study has been ordered to evaluate for sleep apnea.  This test should be scheduled at your earliest convenience.    Sleep Hygiene  TIPS FOR A BETTER NIGHT OF SLEEP BEFORE GETTING INTO BED:  -Establish a regular routine for bedtime.  -Create a positive sleep environment.  -Relax before getting into bed.  -Avoid alcohol, smoking, caffeine for at least a few hours before bedtime.  -Do not go to bed unless you are sleepy.    WHILE IN BED:  -Turn your clock around (or cover it) and use your alarm if needed.  If you can't fall asleep in 20 minutes (based on your internal sense of time), get out of bed and do something relaxing or boring (reading, listening to music, etc). Return to bed only when sleepy.  -Use your bed only for sleep.    IN THE MORNING AND DURING THE DAYTIME:  -Wake up at the same time every morning, even on weekends.  -Avoid naps during the day.  -Avoid caffeinated beverages and food in the evening.  -Exercise regularly but not within 4 hours of bedtime.      - Sleep apnea is a serious sleep disorder that occurs when a person's breathing is interrupted during sleep.  People with untreated sleep apnea stop breathing repeatedly during sleep.  - The most common type of sleep apnea is obstructive sleep apnea.  - If left untreated, obstructive sleep apnea can result in a number of health problems including high blood pressure, stroke, irregular heart rhythms, heart failure, diabetes, obesity and heart attacks.  - Treatment options for obstructive sleep apnea may include positive airway pressure (PAP) therapy, oral appliances, hypoglossal nerve stimulator, nose/throat surgery, weight loss, side sleeping or avoidance of medications or substances that can relax the airway muscles (alcohol, benzodiazepines and opioids).  - Avoid driving is drowsy.  It is recommended that if you are dozing while driving that you do not drive until your sleepiness is appropriately treated.  - It is important to  lead a healthy lifestyle with adequate sleep (7-9 hours per night), balanced diet and routine exercise.

## 2024-09-04 ENCOUNTER — HOSPITAL ENCOUNTER (OUTPATIENT)
Dept: SLEEP CENTER | Facility: CLINIC | Age: 29
Discharge: HOME | End: 2024-09-04
Payer: COMMERCIAL

## 2024-09-04 ENCOUNTER — TELEPHONE (OUTPATIENT)
Age: 29
End: 2024-09-04

## 2024-09-04 DIAGNOSIS — E66.01 CLASS 3 SEVERE OBESITY DUE TO EXCESS CALORIES WITH SERIOUS COMORBIDITY AND BODY MASS INDEX (BMI) OF 50.0 TO 59.9 IN ADULT (HCC): ICD-10-CM

## 2024-09-04 DIAGNOSIS — G47.33 OSA (OBSTRUCTIVE SLEEP APNEA): ICD-10-CM

## 2024-09-04 PROCEDURE — 95811 POLYSOM 6/>YRS CPAP 4/> PARM: CPT

## 2024-09-04 NOTE — TELEPHONE ENCOUNTER
Contacted Pt. in regards to ROUTINE Referral, Pt is looking to be diagnosed with ADHD, lack of focus, numerical dyslexia.    [facility]   [phone]    Pt. Added to [facility], [facility] [place] only

## 2024-09-05 NOTE — PROGRESS NOTES
Sleep Study Documentation    Pre-Sleep Study       Sleep testing procedure explained to patient:YES    Patient napped prior to study:NO    Caffeine:Dayshift worker after 12PM.  Caffeine use:YES- coffee  6 ounces    Alcohol:Dayshift workers after 5PM: Alcohol use:NO    Typical day for patient:YES       Study Documentation    Sleep Study Indications: SUGAR    Sleep Study: Split Optimal PAP pressure: 15CM  Leak:None  Snore:Eliminated  REM Obtained:yes  Supplemental O2: no    Minimum SaO2 68%  Baseline SaO2 96%  PAP mask tried (list all)Resmed Airfit F20 Large  PAP mask choice (final)Resmed Airfit F20 Large  PAP mask type:full face  PAP pressure at which snoring was eliminated 12CM  Minimum SaO2 at final PAP pressure 94%  Mode of Therapy:CPAP  ETCO2:No  CPAP changed to BiPAP:No    Mode of Therapy:CPAP    EKG abnormalities: no     EEG abnormalities: no    Were abnormal behaviors in sleep observed:NO    Is Total Sleep Study Recording Time < 2 hours: N/A    Is Total Sleep Study Recording Time > 2 hours but study is incomplete: N/A    Is Total Sleep Study Recording Time 6 hours or more but sleep was not obtained: NO    Patient classification: employed       Post-Sleep Study    Medication used at bedtime or during sleep study:NO    Patient reports time it took to fall asleep:less than 20 minutes    Patient reports waking up during study:1 to 2 times.  Patient reports returning to sleep without difficulty.    Patient reports sleeping 4 to 6 hours without dreaming.    Does the Patient feel this is a typical night of sleep:better than usual    Patient rated sleepiness: Not sleepy or tired    PAP treatment:yes: Post PAP treatment patient reports feeling better and  would wear PAP mask at home.

## 2024-09-06 ENCOUNTER — TELEPHONE (OUTPATIENT)
Age: 29
End: 2024-09-06

## 2024-09-06 NOTE — TELEPHONE ENCOUNTER
"Behavioral Health Outpatient Intake Questions    Referred By   : PCP    Please advise interviewee that they need to answer all questions truthfully to allow for best care, and any misrepresentations of information may affect their ability to be seen at this clinic   => Was this discussed? Yes       Behavioral Health Outpatient Intake History -     Presenting Problem (in patient's own words):   Hard time focusing since adolescence, possible ADHD, dyslexia    Are there any communication barriers for this patient?     No                                                   Are you taking any psychiatric medications? No     Has the Patient previously received outpatient Talk Therapy or Medication Management from [facility]  NO    Has the Patient abused alcohol or other substances in the last 6 months ? No        Legal History-     Is this treatment court ordered? No       Has the Patient been convicted of a felony?  NO      ACCEPTED as a patient Yes  If \"Yes\" Appointment Date: 11/2 at 1:00 with Dr. Royal    Referred Elsewhere? No      Name of Insurance Co:[employer]  Insurance ID# [id number]   Insurance Phone #  If ins is primary or secondary?Primary  If patient is a minor, parents information such as Name, D.O.B of guarantor.  "

## 2024-09-11 ENCOUNTER — TELEPHONE (OUTPATIENT)
Dept: PSYCHIATRY | Facility: CLINIC | Age: 29
End: 2024-09-11

## 2024-09-11 NOTE — TELEPHONE ENCOUNTER
One week follow up call for New Patient appointment with   Genny Dobbins DO   on 10/02/2024 was made on 09/11/2024. Writer informed patient of New Patient paperwork needing to be completed 5 days prior to the appointment. Writer confirmed paperwork has been sent via [employer].    Appointment was made on: 09/06/2024

## 2024-09-16 DIAGNOSIS — R41.3 MEMORY LOSS, SHORT TERM: Primary | ICD-10-CM

## 2024-09-16 PROBLEM — E66.813 CLASS 3 SEVERE OBESITY DUE TO EXCESS CALORIES WITH SERIOUS COMORBIDITY AND BODY MASS INDEX (BMI) OF 50.0 TO 59.9 IN ADULT (HCC): Status: ACTIVE | Noted: 2022-12-29

## 2024-09-16 NOTE — PROGRESS NOTES
Patient has not gone for lab work yet. Additional labs ordered today d/t memory loss complaints. Patient will be notified of changes. Will request sooner follow up than scheduled appointment in November.

## 2024-09-19 ENCOUNTER — PATIENT MESSAGE (OUTPATIENT)
Dept: SLEEP CENTER | Facility: CLINIC | Age: 29
End: 2024-09-19

## 2024-09-19 DIAGNOSIS — G47.33 OSA (OBSTRUCTIVE SLEEP APNEA): Primary | ICD-10-CM

## 2024-09-24 NOTE — PATIENT COMMUNICATION
Return call from patient. Reviewed sleep study results and recommendation for CPAP.     Patient chose [employer] as DME provider.     Compliance appointment scheduled for 12/5/2024 @ 8:40 am with Dr. Ivan.     Patient added to teams DME Set Up chat.

## 2024-09-26 ENCOUNTER — TELEPHONE (OUTPATIENT)
Dept: SLEEP CENTER | Facility: CLINIC | Age: 29
End: 2024-09-26

## 2024-10-02 ENCOUNTER — OFFICE VISIT (OUTPATIENT)
Dept: PSYCHIATRY | Facility: CLINIC | Age: 29
End: 2024-10-02
Payer: COMMERCIAL

## 2024-10-02 DIAGNOSIS — R41.840 ATTENTION DEFICIT: Primary | ICD-10-CM

## 2024-10-02 DIAGNOSIS — F90.9 ATTENTION DEFICIT HYPERACTIVITY DISORDER (ADHD), UNSPECIFIED ADHD TYPE: ICD-10-CM

## 2024-10-02 PROCEDURE — 90792 PSYCH DIAG EVAL W/MED SRVCS: CPT | Performed by: STUDENT IN AN ORGANIZED HEALTH CARE EDUCATION/TRAINING PROGRAM
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
## 2024-10-08 ENCOUNTER — TELEPHONE (OUTPATIENT)
Dept: PSYCHIATRY | Facility: CLINIC | Age: 29
End: 2024-10-08

## 2024-10-08 NOTE — TELEPHONE ENCOUNTER
Left voicemail informing patient and/or parent/guardian of the Psych Encounter form needing to be signed as a requirement from the insurance company for billing purposes. Patient can access form via [employer] and sign electronically.     Please make patient aware this form must be signed for each visit as a requirement to continue future visits with provider.

## 2024-10-28 ENCOUNTER — OFFICE VISIT (OUTPATIENT)
Dept: URGENT CARE | Facility: CLINIC | Age: 29
End: 2024-10-28
Payer: COMMERCIAL

## 2024-10-28 VITALS
SYSTOLIC BLOOD PRESSURE: 130 MMHG | BODY MASS INDEX: 45.1 KG/M2 | RESPIRATION RATE: 20 BRPM | TEMPERATURE: 100.3 F | HEART RATE: 113 BPM | OXYGEN SATURATION: 97 % | DIASTOLIC BLOOD PRESSURE: 80 MMHG | HEIGHT: 70 IN | WEIGHT: 315 LBS

## 2024-10-28 DIAGNOSIS — J06.9 ACUTE URI: Primary | ICD-10-CM

## 2024-10-28 DIAGNOSIS — J02.9 SORE THROAT: ICD-10-CM

## 2024-10-28 LAB — S PYO AG THROAT QL: NEGATIVE

## 2024-10-28 PROCEDURE — 87880 STREP A ASSAY W/OPTIC: CPT

## 2024-10-28 PROCEDURE — 99213 OFFICE O/P EST LOW 20 MIN: CPT

## 2024-10-28 RX ORDER — BENZONATATE 200 MG/1
200 CAPSULE ORAL 3 TIMES DAILY PRN
Qty: 20 CAPSULE | Refills: 0 | Status: SHIPPED | OUTPATIENT
Start: 2024-10-28

## 2024-10-28 NOTE — PROGRESS NOTES
[facility]        NAME: Sourav King is a 28 y.o. male  : 1995    MRN: [id number]  DATE: 2024  TIME: 9:49 AM    Assessment and Plan   Acute URI [J06.9]  1. Acute URI  Throat culture    Throat culture    benzonatate (TESSALON) 200 MG capsule      2. Sore throat  POCT rapid strepA    Throat culture    Throat culture        Discussed with patient that Sudafed is not optimal for someone who has high blood pressure as it can raise her blood pressure.  Provided him with alternative remedies and medications he can trial.    Patient Instructions   Strep A test was negative in the office today.  We are sending the throat swab for culture to test for Strep B,C, & G which are other types of Strep that a rapid test does not exist for.   It takes approximately 48-72 hours before we have results as sufficient time is needed for bacteria to grow.  If throat culture returns positive, you will receive a phone call for treatment.    At this time you have been diagnosed with a Viral Infection (COVID, influzena, RSV are more well known types of viruses) which your body will fight off in about 7-14 days.  Antibiotics are not indicated for viral infections.   Taking antibiotics while you have a viral infection is the wrong treatment for a viral infection.  Viruses are self limiting meaning your body fights it off given sufficient time to do so.  For viral illnesses, the recommendation is for supportive care which would consists of the following:  For decongestion:  Claritin or Zyrtec plus -Coricidin HBP (Safe for when you have high blood pressure)  Nasal corticosteroid: examples are Flonase or Nasacort.  Nasal antihistamine: Astepro  Nasal saline irrigation  Humidified air  Warm moist air such as a hot cup of water in a mug, sit at the dining room table with the mug on the table, put a towel over your head to cover over the mug and breath in the warm steam (don't drink the fluid in case you have mucus that  drips in)  Vicks Vapor Rub  Mucinex as an expectorant  For Cough or sore throat:  Salt water gurgle  Teaspoon of Honey up to 3x/day  Chloraseptic spray  Throat lozenges  Over the Counter Tylenol or Ibuprofen  Dextromethorphan 30mg PO every 6-8 hours for cough. max 120 mg in 24 hour period    Follow up with Primary Care Provider in 3-5 days if not improving.  Proceed to Emergency Department if symptoms worsen.    If tests have been performed at [facility], our office will contact you with results if changes need to be made to the care plan discussed with you at the visit.  You can review your full results on [facility].    Chief Complaint     Chief Complaint   Patient presents with    Cold Like Symptoms     Pt reports non-productive cough and sore throat with onset Friday. States felt feverish on Saturday with chills. Managing symptoms with Sudafed.          History of Present Illness       Patient reports sore throat and nonproductive cough starting about 3 days ago.  States he felt feverish on 2 days ago and has been managing his symptoms with Sudafed.  States his 2 daughters came home with strep from school several days ago.  He was unsure of what else he can take because he takes lisinopril for high blood pressure        Review of Systems   Review of Systems   Constitutional:  Positive for chills, fatigue and fever.   HENT:  Positive for sore throat. Negative for congestion, ear discharge, ear pain, rhinorrhea, sinus pressure and sinus pain.    Respiratory:  Positive for cough.          Current Medications       Current Outpatient Medications:     benzonatate (TESSALON) 200 MG capsule, Take 1 capsule (200 mg total) by mouth 3 (three) times a day as needed for cough, Disp: 20 capsule, Rfl: 0    lisinopril (ZESTRIL) 40 mg tablet, Take 1 tablet (40 mg total) by mouth daily, Disp: 90 tablet, Rfl: 1    carbamide peroxide (DEBROX) 6.5 % otic solution, Administer 5 drops to the right ear 2 (two) times a day  "(Patient not taking: Reported on 5/30/2023), Disp: 15 mL, Rfl: 0    Ibuprofen (ADVIL PO), Take by mouth if needed (Patient not taking: Reported on 8/29/2024), Disp: , Rfl:     Current Allergies     Allergies as of 10/28/2024 - Reviewed 10/28/2024   Allergen Reaction Noted    [place] [fish oil - food allergy] Anaphylaxis 05/22/2019    Nuts - food allergy Throat Swelling 12/29/2022    Other  05/19/2017    Latex Rash 09/20/2017            The following portions of the patient's history were reviewed and updated as appropriate: allergies, current medications, past family history, past medical history, past social history, past surgical history and problem list.     Past Medical History:   Diagnosis Date    Malignant hyperthemia     Family history of malignant hyperthermia, pt never tested for it       Past Surgical History:   Procedure Laterality Date    EAR SURGERY      FRACTURE SURGERY Right     Rt foot Fx 5th metatarsal surgery 05/2017, with internal pin    TONSILLECTOMY         History reviewed. No pertinent family history.      Medications have been verified.        Objective   /80 (BP Location: Right arm, Patient Position: Sitting)   Pulse (!) 113   Temp 100.3 °F (37.9 °C)   Resp 20   Ht 5' 10\" (1.778 m)   Wt (!) 163 kg (359 lb)   SpO2 97%   BMI 51.51 kg/m²   No LMP for male patient.       Physical Exam     Physical Exam  Vitals and nursing note reviewed.   Constitutional:       Appearance: Normal appearance.   HENT:      Head: Normocephalic and atraumatic.      Right Ear: Tympanic membrane normal.      Left Ear: Tympanic membrane normal.      Nose: Nose normal. No congestion or rhinorrhea.      Mouth/Throat:      Mouth: Mucous membranes are moist.      Pharynx: No oropharyngeal exudate or posterior oropharyngeal erythema.   Eyes:      Conjunctiva/sclera: Conjunctivae normal.      Pupils: Pupils are equal, round, and reactive to light.   Cardiovascular:      Rate and Rhythm: Tachycardia present.      " Pulses: Normal pulses.   Pulmonary:      Effort: Pulmonary effort is normal.      Breath sounds: Normal breath sounds. No wheezing, rhonchi or rales.   Musculoskeletal:      Cervical back: Normal range of motion and neck supple. No rigidity.   Skin:     General: Skin is warm and dry.      Capillary Refill: Capillary refill takes less than 2 seconds.   Neurological:      General: No focal deficit present.      Mental Status: He is alert and oriented to person, place, and time. Mental status is at baseline.      Sensory: No sensory deficit.      Motor: No weakness.   Psychiatric:         Mood and Affect: Mood normal.         Behavior: Behavior normal.         Thought Content: Thought content normal.

## 2024-10-28 NOTE — PATIENT INSTRUCTIONS
Strep A test was negative in the office today.  We are sending the throat swab for culture to test for Strep B,C, & G which are other types of Strep that a rapid test does not exist for.   It takes approximately 48-72 hours before we have results as sufficient time is needed for bacteria to grow.  If throat culture returns positive, you will receive a phone call for treatment.    At this time you have been diagnosed with a Viral Infection (COVID, influzena, RSV are more well known types of viruses) which your body will fight off in about 7-14 days.  Antibiotics are not indicated for viral infections.   Taking antibiotics while you have a viral infection is the wrong treatment for a viral infection.  Viruses are self limiting meaning your body fights it off given sufficient time to do so.  For viral illnesses, the recommendation is for supportive care which would consists of the following:  For decongestion:  Claritin or Zyrtec plus -Coricidin HBP (Safe for when you have high blood pressure)  Nasal corticosteroid: examples are Flonase or Nasacort.  Nasal antihistamine: Astepro  Nasal saline irrigation  Humidified air  Warm moist air such as a hot cup of water in a mug, sit at the dining room table with the mug on the table, put a towel over your head to cover over the mug and breath in the warm steam (don't drink the fluid in case you have mucus that drips in)  Vicks Vapor Rub  Mucinex as an expectorant  For Cough or sore throat:  Salt water gurgle  Teaspoon of Honey up to 3x/day  Chloraseptic spray  Throat lozenges  Over the Counter Tylenol or Ibuprofen  Dextromethorphan 30mg PO every 6-8 hours for cough. max 120 mg in 24 hour period    Follow up with Primary Care Provider in 3-5 days if not improving.  Proceed to Emergency Department if symptoms worsen.    If tests have been performed at [facility], our office will contact you with results if changes need to be made to the care plan discussed with you at the visit.   You can review your full results on [facility].

## 2024-11-01 LAB — B-HEM STREP SPEC QL CULT: NEGATIVE

## 2024-11-04 ENCOUNTER — TELEPHONE (OUTPATIENT)
Dept: URGENT CARE | Facility: CLINIC | Age: 29
End: 2024-11-04

## 2024-11-05 ENCOUNTER — OFFICE VISIT (OUTPATIENT)
Dept: FAMILY MEDICINE CLINIC | Facility: CLINIC | Age: 29
End: 2024-11-05
Payer: COMMERCIAL

## 2024-11-05 VITALS
BODY MASS INDEX: 45.1 KG/M2 | OXYGEN SATURATION: 95 % | SYSTOLIC BLOOD PRESSURE: 140 MMHG | HEART RATE: 115 BPM | WEIGHT: 315 LBS | HEIGHT: 70 IN | TEMPERATURE: 98.3 F | RESPIRATION RATE: 20 BRPM | DIASTOLIC BLOOD PRESSURE: 80 MMHG

## 2024-11-05 DIAGNOSIS — R05.9 COUGH, UNSPECIFIED TYPE: Primary | ICD-10-CM

## 2024-11-05 PROCEDURE — 99213 OFFICE O/P EST LOW 20 MIN: CPT

## 2024-11-05 NOTE — PROGRESS NOTES
"Ambulatory Visit  Name: Sourav King      : 1995      MRN: [id number]  Encounter Provider: VASU Duffy  Encounter Date: 2024   Encounter department: [facility]    Assessment & Plan  Cough, unspecified type  Discussed viral illness, cough duration. Continue supportive measures. Continue use of benzonatate as prescribed by urgent care provider. Pt understands to return if symptoms worsen or fever develops. Follow up as needed.            History of Present Illness     Sourav King is a 28 y.o. year old male who presents today with a concern of continuing illness. Was having some shortness of breath and chest tightness - that has improved. Reports continued mucous. He is taking the benzonatate for his cough which is helpful. He feels he is improving.            Review of Systems   Constitutional:  Positive for fatigue. Negative for chills and fever.   HENT: Negative.  Negative for congestion, ear pain, rhinorrhea and sore throat.    Eyes: Negative.  Negative for pain and visual disturbance.   Respiratory:  Positive for cough. Negative for apnea, chest tightness and shortness of breath.    Cardiovascular: Negative.  Negative for chest pain, palpitations and leg swelling.   Gastrointestinal:  Negative for abdominal pain, constipation, diarrhea, nausea and vomiting.   Endocrine: Negative.    Genitourinary: Negative.  Negative for dysuria, frequency and urgency.   Musculoskeletal: Negative.  Negative for back pain and myalgias.   Skin: Negative.  Negative for rash.   Allergic/Immunologic: Negative.    Neurological: Negative.  Negative for dizziness, weakness, light-headedness and headaches.   Hematological: Negative.    Psychiatric/Behavioral: Negative.             Objective     /80 (BP Location: Left arm, Patient Position: Sitting, Cuff Size: Large)   Pulse (!) 115   Temp 98.3 °F (36.8 °C) (Tympanic)   Resp 20   Ht 5' 10\" (1.778 m)   Wt (!) 160 kg (351 lb " 12.8 oz)   SpO2 95%   BMI 50.48 kg/m²     Physical Exam  Vitals and nursing note reviewed.   Constitutional:       General: He is not in acute distress.     Appearance: Normal appearance. He is morbidly obese. He is not ill-appearing.   HENT:      Head: Normocephalic and atraumatic.      Right Ear: Tympanic membrane, ear canal and external ear normal.      Left Ear: Tympanic membrane, ear canal and external ear normal.      Nose: Nose normal. No congestion.      Mouth/Throat:      Mouth: Mucous membranes are moist.      Pharynx: Oropharynx is clear. No posterior oropharyngeal erythema.   Eyes:      Extraocular Movements: Extraocular movements intact.      Conjunctiva/sclera: Conjunctivae normal.      Pupils: Pupils are equal, round, and reactive to light.   Cardiovascular:      Rate and Rhythm: Normal rate and regular rhythm.      Heart sounds: Normal heart sounds. No murmur heard.  Pulmonary:      Effort: Pulmonary effort is normal. No respiratory distress.      Breath sounds: Normal breath sounds. No wheezing or rhonchi.   Musculoskeletal:         General: No tenderness. Normal range of motion.      Cervical back: Normal range of motion and neck supple. No tenderness.   Lymphadenopathy:      Cervical: No cervical adenopathy.   Skin:     General: Skin is warm and dry.      Capillary Refill: Capillary refill takes less than 2 seconds.      Findings: No bruising or rash.   Neurological:      General: No focal deficit present.      Mental Status: He is alert and oriented to person, place, and time.   Psychiatric:         Mood and Affect: Mood normal.         Behavior: Behavior normal.

## 2024-11-11 ENCOUNTER — OFFICE VISIT (OUTPATIENT)
Dept: FAMILY MEDICINE CLINIC | Facility: CLINIC | Age: 29
End: 2024-11-11
Payer: COMMERCIAL

## 2024-11-11 VITALS
HEIGHT: 70 IN | HEART RATE: 90 BPM | SYSTOLIC BLOOD PRESSURE: 124 MMHG | BODY MASS INDEX: 45.1 KG/M2 | WEIGHT: 315 LBS | TEMPERATURE: 98.5 F | OXYGEN SATURATION: 98 % | DIASTOLIC BLOOD PRESSURE: 82 MMHG | RESPIRATION RATE: 20 BRPM

## 2024-11-11 DIAGNOSIS — I10 PRIMARY HYPERTENSION: Primary | ICD-10-CM

## 2024-11-11 PROCEDURE — 99214 OFFICE O/P EST MOD 30 MIN: CPT | Performed by: FAMILY MEDICINE

## 2024-11-11 RX ORDER — GUAIFENESIN 200 MG/10ML
200 LIQUID ORAL 3 TIMES DAILY PRN
COMMUNITY

## 2024-11-11 NOTE — PROGRESS NOTES
Sourav King 1995 male MRN: [id number]    Family Medicine Follow-up Visit    ASSESSMENT/PLAN  Problem List Items Addressed This Visit          Cardiovascular and Mediastinum    Primary hypertension - Primary     BP is much better  Continue lisinopril 40 mg daily as prescribed              Get labs done when able as he is overdue.  Follow up in 6 months for HTN check up           Future Appointments   Date Time Provider Department Center   12/3/2024  3:00 PM [facility]   12/5/2024  8:40 AM Aaliyah Ivan MD [facility]          SUBJECTIVE  CC: Follow-up (3 month) and Blood Pressure Check (Lisinipril 40 mg now)      HPI:  Sourav King is a 29 y.o. male who presents for blood pressure check up    HPI    Review of Systems   Constitutional:  Negative for chills and fever.   HENT:  Negative for congestion, postnasal drip, rhinorrhea and sinus pain.    Eyes:  Negative for photophobia and visual disturbance.   Respiratory:  Negative for cough and shortness of breath.    Cardiovascular:  Negative for chest pain, palpitations and leg swelling.   Gastrointestinal:  Negative for abdominal pain, constipation, diarrhea, nausea and vomiting.   Genitourinary:  Negative for difficulty urinating and dysuria.   Musculoskeletal:  Negative for arthralgias and myalgias.   Skin:  Negative for rash.   Neurological:  Negative for dizziness and syncope.       Historical Information   The patient history was reviewed as follows:    Past Medical History:   Diagnosis Date    Malignant hyperthemia     Family history of malignant hyperthermia, pt never tested for it     Past Surgical History:   Procedure Laterality Date    EAR SURGERY      FRACTURE SURGERY Right     Rt foot Fx 5th metatarsal surgery 05/2017, with internal pin    TONSILLECTOMY       History reviewed. No pertinent family history.   Social History   Social History     Substance and Sexual Activity   Alcohol Use Yes    Alcohol/week: 1.0 standard drink  "of alcohol    Types: 1 Standard drinks or equivalent per week    Comment: socially once a week     Social History     Substance and Sexual Activity   Drug Use No     Social History     Tobacco Use   Smoking Status Some Days    Types: Cigars    Passive exposure: Never   Smokeless Tobacco Former    Quit date: 6/20/2015   Tobacco Comments    Cigars once a week       Medications:     Current Outpatient Medications:     benzonatate (TESSALON) 200 MG capsule, Take 1 capsule (200 mg total) by mouth 3 (three) times a day as needed for cough, Disp: 20 capsule, Rfl: 0    guaiFENesin (ROBITUSSIN) 100 MG/5ML oral liquid, Take 200 mg by mouth 3 (three) times a day as needed for cough, Disp: , Rfl:     lisinopril (ZESTRIL) 40 mg tablet, Take 1 tablet (40 mg total) by mouth daily, Disp: 90 tablet, Rfl: 1    carbamide peroxide (DEBROX) 6.5 % otic solution, Administer 5 drops to the right ear 2 (two) times a day (Patient not taking: Reported on 5/30/2023), Disp: 15 mL, Rfl: 0    Ibuprofen (ADVIL PO), Take by mouth if needed (Patient not taking: Reported on 8/29/2024), Disp: , Rfl:   Allergies   Allergen Reactions    [place] [Fish Oil - Food Allergy] Anaphylaxis    Nuts - Food Allergy Throat Swelling     PISTACHIOS only    Other      Mold and pollen    Latex Rash       OBJECTIVE    Vitals:   Vitals:    11/11/24 0729 11/11/24 0751   BP: 140/80 124/82   BP Location: Left arm    Patient Position: Sitting    Cuff Size: Large    Pulse: (!) 118 90   Resp: 20    Temp: 98.5 °F (36.9 °C)    TempSrc: Tympanic    SpO2: 98%    Weight: (!) 159 kg (350 lb)    Height: 5' 10\" (1.778 m)            Physical Exam  Constitutional:       Appearance: He is well-developed.   HENT:      Head: Normocephalic and atraumatic.      Right Ear: External ear normal.      Left Ear: External ear normal.   Eyes:      Conjunctiva/sclera: Conjunctivae normal.   Cardiovascular:      Rate and Rhythm: Normal rate and regular rhythm.      Heart sounds: Normal heart sounds. " No murmur heard.  Pulmonary:      Effort: Pulmonary effort is normal. No respiratory distress.      Breath sounds: Normal breath sounds. No wheezing.   Musculoskeletal:         General: Normal range of motion.      Cervical back: Normal range of motion and neck supple.   Skin:     General: Skin is warm and dry.   Neurological:      Mental Status: He is alert and oriented to person, place, and time.   Psychiatric:         Mood and Affect: Mood normal.         Behavior: Behavior normal.            Labs:        Roxane Alberto DO    11/11/2024

## 2024-12-05 LAB
DME PARACHUTE DELIVERY DATE REQUESTED: NORMAL
DME PARACHUTE ITEM DESCRIPTION: NORMAL
DME PARACHUTE ORDER STATUS: NORMAL
DME PARACHUTE SUPPLIER NAME: NORMAL
DME PARACHUTE SUPPLIER PHONE: NORMAL

## 2024-12-09 LAB

## 2024-12-16 LAB

## 2025-02-04 ENCOUNTER — OFFICE VISIT (OUTPATIENT)
Dept: SLEEP CENTER | Facility: CLINIC | Age: 30
End: 2025-02-04
Payer: COMMERCIAL

## 2025-02-04 VITALS
HEART RATE: 107 BPM | WEIGHT: 315 LBS | SYSTOLIC BLOOD PRESSURE: 153 MMHG | BODY MASS INDEX: 51.51 KG/M2 | DIASTOLIC BLOOD PRESSURE: 82 MMHG

## 2025-02-04 DIAGNOSIS — E66.813 CLASS 3 SEVERE OBESITY DUE TO EXCESS CALORIES WITH SERIOUS COMORBIDITY AND BODY MASS INDEX (BMI) OF 50.0 TO 59.9 IN ADULT (HCC): ICD-10-CM

## 2025-02-04 DIAGNOSIS — I10 PRIMARY HYPERTENSION: ICD-10-CM

## 2025-02-04 DIAGNOSIS — G47.33 OSA (OBSTRUCTIVE SLEEP APNEA): Primary | ICD-10-CM

## 2025-02-04 DIAGNOSIS — E66.01 CLASS 3 SEVERE OBESITY DUE TO EXCESS CALORIES WITH SERIOUS COMORBIDITY AND BODY MASS INDEX (BMI) OF 50.0 TO 59.9 IN ADULT (HCC): ICD-10-CM

## 2025-02-04 PROCEDURE — 99214 OFFICE O/P EST MOD 30 MIN: CPT | Performed by: PHYSICIAN ASSISTANT

## 2025-02-04 NOTE — ASSESSMENT & PLAN NOTE
We discussed that significant weight loss can potentially improve and resolve obstructive sleep apnea.

## 2025-02-04 NOTE — PATIENT INSTRUCTIONS
Your compliance data looks great as far as your sleep apnea is very well-controlled.  My only recommendation would be to use it anytime you are sleeping and at least 4 hours least 70% of the time for insurance requirements.  If you feel that the air is too warm and moist, you may reduce the heat and humidity settings.  Heat and humidity are for your comfort and have nothing to do with treatment.  If you are unsure how to do this I would reach out to [employer].  If you continue with leakage around your nasal bridge, I would likely recommend trying a hybrid style mask.    Nursing Support:  When: Monday through Friday 7A-5PM except holidays  Where: Our direct line is [phone].    If you are having a true emergency please call 911.  In the event that the line is busy or it is after hours please leave a voice message and we will return your call.  Please speak clearly, leaving your full name, birth date, best number to reach you and the reason for your call.   Medication refills: We will need the name of the medication, the dosage, the ordering provider, whether you get a 30 or 90 day refill, and the pharmacy name and address.  Medications will be ordered by the provider only.  Nurses cannot call in prescriptions.  Please allow 7 days for medication refills.  Physician requested updates: If your provider requested that you call with an update after starting medication, please be ready to provide us the medication and dosage, what time you take your medication, the time you attempt to fall asleep, time you fall asleep, when you wake up, and what time you get out of bed.  Sleep Study Results: We will contact you with sleep study results and/or next steps after the physician has reviewed your testing.

## 2025-02-04 NOTE — PROGRESS NOTES
Name: Sourva King      : 1995      MRN: [id number]  Encounter Provider: Zohra Gallegos PA-C  Encounter Date: 2025   Encounter department: [facility] SLEEP MEDICINE [place]  :  Assessment & Plan  SUGAR (obstructive sleep apnea)  Patient has severe obstructive sleep apnea.  He was recently set up with CPAP in December.  Sleep apnea is well-controlled with CPAP at a setting of 10 to 15 cm of H2O.  Residual AHI of 0.5.  He does find that he sleeps better with less awakenings and is more refreshed upon awakening.  Overall, he has no significant concerns with his CPAP.  He is experiencing some minor ear leakage around his nasal bridge.  I advised him if this continues to try a hybrid style mask.  He also feels that the heat and humidity might be slightly high.  I advised him he may adjust the settings at any time as they are for comfort.  I encouraged him to use his CPAP anytime he is sleeping due to the severity of his sleep apnea.       Primary hypertension  Patient's blood pressure is elevated today.  Encouraged him to follow-up with his primary care doctor.  We also discussed that treating his sleep apnea can help make his blood pressure easier to treat and reduce his cardiovascular event risk.       Class 3 severe obesity due to excess calories with serious comorbidity and body mass index (BMI) of 50.0 to 59.9 in adult (HCC)  We discussed that significant weight loss can potentially improve and resolve obstructive sleep apnea.           History of Present Illness   HPI Sourav King is a 29-year-old male who presents today in follow-up of severe obstructive sleep apnea.  He initially completed a home sleep study in 2023, which demonstrated severe obstructive sleep apnea with an GETACHEW of 61.  He then saw Dr. Ivan in follow-up, and she ordered a split-night sleep study with transcutaneous carbon dioxide monitoring.  Pretreatment AHI was 20.7 with no supine sleep.  Oxygen jil of 68%.  Patient  was then titrated with CPAP and found to be effective at a setting of 10 to 15 cm of H2O.  Patient feels he is sleeping better, more deeply, and feeling more rested upon awakening.      CPAP  ResMed AirSense 11 set up in December 2024  Pressure set at 10 to 15 cm of H2O  Fullface mask  DME provider is [facility]    Compliance  26/30 days, 87%  Greater than 4 hours 53%  Average session of 4 hours and 22 minutes nightly  Residual AHI 0.5    Sleep schedule  Patient works night shift 4 nights a week  Bedtime 6:15 AM  Wake up time at noon to spend time with his 3 children  Sleeps again from 4:30 PM to 10 PM  He then works from 11 PM to 5:30 AM  On days off, he tries to go to bed on a more traditional schedule at approximately 10 PM and wake up with his children in the morning.    Sitting and reading: Moderate chance of dozing  Watching TV: Moderate chance of dozing  Sitting, inactive in a public place (e.g. a theatre or a meeting): Moderate chance of dozing  As a passenger in a car for an hour without a break: High chance of dozing  Lying down to rest in the afternoon when circumstances permit: Moderate chance of dozing  Sitting and talking to someone: Would never doze  Sitting quietly after a lunch without alcohol: Would never doze  In a car, while stopped for a few minutes in traffic: Would never doze  Total score: 11     Review of Systems   Constitutional:  Negative for fatigue and unexpected weight change.   HENT:  Negative for congestion, nosebleeds and rhinorrhea.    Respiratory:  Negative for cough, shortness of breath and wheezing.    Cardiovascular:  Negative for palpitations and leg swelling.   Allergic/Immunologic: Negative for environmental allergies.   Neurological:  Negative for headaches.   Psychiatric/Behavioral:  Negative for decreased concentration. The patient is not nervous/anxious.      Pertinent positives/negatives included in HPI and also as noted:       Objective   /82   Pulse (!) 107    "Wt (!) 163 kg (359 lb)   BMI 51.51 kg/m²        Physical Exam  Vitals reviewed.   Constitutional:       General: He is not in acute distress.     Appearance: He is not ill-appearing.   Pulmonary:      Effort: Pulmonary effort is normal.   Neurological:      Mental Status: He is alert.   Psychiatric:         Mood and Affect: Mood normal.         Data  Lab Results   Component Value Date    HGB 14.8 02/18/2023    HCT 44.4 02/18/2023    MCV 78 (L) 02/18/2023      Lab Results   Component Value Date    K 4.8 02/18/2023    CO2 26 02/18/2023     02/18/2023    BUN 12 02/18/2023    CREATININE 0.70 (L) 02/18/2023     No results found for: \"IRON\", \"TIBC\", \"FERRITIN\"  Lab Results   Component Value Date    AST 13 02/18/2023    ALT 25 02/18/2023         "

## 2025-02-04 NOTE — ASSESSMENT & PLAN NOTE
Patient has severe obstructive sleep apnea.  He was recently set up with CPAP in December.  Sleep apnea is well-controlled with CPAP at a setting of 10 to 15 cm of H2O.  Residual AHI of 0.5.  He does find that he sleeps better with less awakenings and is more refreshed upon awakening.  Overall, he has no significant concerns with his CPAP.  He is experiencing some minor ear leakage around his nasal bridge.  I advised him if this continues to try a hybrid style mask.  He also feels that the heat and humidity might be slightly high.  I advised him he may adjust the settings at any time as they are for comfort.  I encouraged him to use his CPAP anytime he is sleeping due to the severity of his sleep apnea.

## 2025-02-04 NOTE — ASSESSMENT & PLAN NOTE
Patient's blood pressure is elevated today.  Encouraged him to follow-up with his primary care doctor.  We also discussed that treating his sleep apnea can help make his blood pressure easier to treat and reduce his cardiovascular event risk.

## 2025-02-26 DIAGNOSIS — I10 PRIMARY HYPERTENSION: ICD-10-CM

## 2025-02-27 RX ORDER — LISINOPRIL 40 MG/1
40 TABLET ORAL DAILY
Qty: 30 TABLET | Refills: 0 | Status: SHIPPED | OUTPATIENT
Start: 2025-02-27

## 2025-03-24 DIAGNOSIS — I10 PRIMARY HYPERTENSION: ICD-10-CM

## 2025-03-25 RX ORDER — LISINOPRIL 40 MG/1
40 TABLET ORAL DAILY
Qty: 30 TABLET | Refills: 0 | Status: SHIPPED | OUTPATIENT
Start: 2025-03-25

## 2025-04-21 DIAGNOSIS — I10 PRIMARY HYPERTENSION: ICD-10-CM

## 2025-04-21 RX ORDER — LISINOPRIL 40 MG/1
40 TABLET ORAL DAILY
Qty: 30 TABLET | Refills: 0 | Status: SHIPPED | OUTPATIENT
Start: 2025-04-21

## 2025-04-28 ENCOUNTER — TELEPHONE (OUTPATIENT)
Dept: PSYCHIATRY | Facility: CLINIC | Age: 30
End: 2025-04-28

## 2025-04-28 NOTE — PROGRESS NOTES
PSYCHIATRIC DISCHARGE SUMMARY    [facility]    Name and Date of Birth:  Sourav King 29 y.o. 1995    Admission Date: See initial encounter    Discharge Date: 4/28/2025     Referral source: Self    Discharge Type: Did not return for follow-up/comply with treatment plan    Discharge Diagnosis:    As per most recent chronological note      Treating Physician: Dr. Genny Dobbins     Treatment Complications: Did not comply with treatment plan, return for follow-up     Admit with discharge: No    Prognosis at time of discharge: Guarded, last reviewed on most recent encounter    Presenting Problems/Pertinent Findings:      As per initial encounter note        Past Psychiatric History:     As per initial encounter note      Traumatic History:     As per initial encounter note    Past Medical History:    Past Medical History:   Diagnosis Date    Malignant hyperthemia     Family history of malignant hyperthermia, pt never tested for it        Past Surgical History:   Procedure Laterality Date    EAR SURGERY      FRACTURE SURGERY Right     Rt foot Fx 5th metatarsal surgery 05/2017, with internal pin    TONSILLECTOMY         Allergies:    Allergies   Allergen Reactions    [place] [Fish Oil - Food Allergy] Anaphylaxis    Nuts - Food Allergy Throat Swelling     PISTACHIOS only    Other      Mold and pollen    Latex Rash       Substance Abuse History:     Social History     Substance and Sexual Activity   Drug Use No     Social History     Substance and Sexual Activity   Alcohol Use Yes    Alcohol/week: 1.0 standard drink of alcohol    Types: 1 Standard drinks or equivalent per week    Comment: socially once a week       Family Psychiatric History:     No family history on file.    Social History/Trauma History/Past Psychiatric History:    Social History     Socioeconomic History    Marital status: /Civil Union     Spouse name: Not on file    Number of children: Not on  file    Years of education: Not on file    Highest education level: Not on file   Occupational History    Not on file   Tobacco Use    Smoking status: Some Days     Types: Cigars     Passive exposure: Never    Smokeless tobacco: Former     Quit date: 6/20/2015    Tobacco comments:     Cigars once a week   Vaping Use    Vaping status: Never Used   Substance and Sexual Activity    Alcohol use: Yes     Alcohol/week: 1.0 standard drink of alcohol     Types: 1 Standard drinks or equivalent per week     Comment: socially once a week    Drug use: No    Sexual activity: Not on file   Other Topics Concern    Not on file   Social History Narrative    Not on file     Social Drivers of Health     Financial Resource Strain: Not on file   Food Insecurity: Not on file   Transportation Needs: Not on file   Physical Activity: Not on file   Stress: Not on file   Social Connections: Not on file   Intimate Partner Violence: Not At Risk (12/29/2022)    Humiliation, Afraid, Rape, and Kick questionnaire     Fear of Current or Ex-Partner: No     Emotionally Abused: No     Physically Abused: No     Sexually Abused: No   Housing Stability: Not on file         Therapist: No      Course of Treatment: Initial medication evaluation, medication management      Summary of Treatment Progress:     Sourav was seen for initial psychiatric evaluation and medication management.       Lethality Risk at the time of discharge from clinic: LOW.     At the time of the most recent psychiatric assessment, Sourav was future-oriented, forward-thinking, and demonstrated ability to act in a self-preserving manner as evidenced by volitionally presenting to the clinic, seeking treatment. To mitigate future risk, patient should adhere to treatment recommendations, avoid alcohol/illicit substance use, utilize community-based resources and familiar support, and prioritize mental health treatment.       Suicide/Homicide Risk Assessment performed on last encounter. See  results on last encounter.         History Review:  Reviewed on most recent encounter      MENTAL STATUS EVALUATION (last reviewed on most recent encounter) :      As per most recent chronological encounter        To what extent did Sourav achieve his goals?:  Some goals achieved      Criteria for Discharge: Did not return for follow-up/adhere to treatment plan      Aftercare Recommendations:     Follow-up with therapist/psychiatrist/family physician willing to prescribe psychotropics      Discharge Medications:     Current Outpatient Medications:     benzonatate (TESSALON) 200 MG capsule, Take 1 capsule (200 mg total) by mouth 3 (three) times a day as needed for cough (Patient not taking: Reported on 2/4/2025), Disp: 20 capsule, Rfl: 0    carbamide peroxide (DEBROX) 6.5 % otic solution, Administer 5 drops to the right ear 2 (two) times a day (Patient not taking: Reported on 5/30/2023), Disp: 15 mL, Rfl: 0    guaiFENesin (ROBITUSSIN) 100 MG/5ML oral liquid, Take 200 mg by mouth 3 (three) times a day as needed for cough (Patient not taking: Reported on 2/4/2025), Disp: , Rfl:     Ibuprofen (ADVIL PO), Take by mouth if needed (Patient not taking: Reported on 8/29/2024), Disp: , Rfl:     lisinopril (ZESTRIL) 40 mg tablet, Take 1 tablet by mouth once daily, Disp: 30 tablet, Rfl: 0     Describe ability and willingness to work and solve mental problems: Struggled at following treatment plan    May have difficulty solving problems    Genny Dobbins DO 04/28/25

## 2025-05-09 ENCOUNTER — HOSPITAL ENCOUNTER (EMERGENCY)
Facility: HOSPITAL | Age: 30
Discharge: HOME | End: 2025-05-09
Attending: EMERGENCY MEDICINE
Payer: COMMERCIAL

## 2025-05-09 ENCOUNTER — APPOINTMENT (EMERGENCY)
Dept: RADIOLOGY | Facility: HOSPITAL | Age: 30
End: 2025-05-09
Payer: COMMERCIAL

## 2025-05-09 VITALS
HEART RATE: 101 BPM | SYSTOLIC BLOOD PRESSURE: 172 MMHG | OXYGEN SATURATION: 97 % | TEMPERATURE: 99 F | RESPIRATION RATE: 20 BRPM | DIASTOLIC BLOOD PRESSURE: 95 MMHG

## 2025-05-09 DIAGNOSIS — S46.211A RUPTURE OF RIGHT DISTAL BICEPS TENDON, INITIAL ENCOUNTER: Primary | ICD-10-CM

## 2025-05-09 PROCEDURE — 73080 X-RAY EXAM OF ELBOW: CPT

## 2025-05-09 PROCEDURE — 99284 EMERGENCY DEPT VISIT MOD MDM: CPT | Performed by: EMERGENCY MEDICINE

## 2025-05-09 PROCEDURE — 99283 EMERGENCY DEPT VISIT LOW MDM: CPT

## 2025-05-09 PROCEDURE — 96372 THER/PROPH/DIAG INJ SC/IM: CPT

## 2025-05-09 RX ORDER — METHOCARBAMOL 500 MG/1
500 TABLET, FILM COATED ORAL EVERY 8 HOURS PRN
Qty: 20 TABLET | Refills: 0 | Status: SHIPPED | OUTPATIENT
Start: 2025-05-09 | End: 2025-05-22

## 2025-05-09 RX ORDER — KETOROLAC TROMETHAMINE 30 MG/ML
15 INJECTION, SOLUTION INTRAMUSCULAR; INTRAVENOUS ONCE
Status: COMPLETED | OUTPATIENT
Start: 2025-05-09 | End: 2025-05-09

## 2025-05-09 RX ORDER — ACETAMINOPHEN 325 MG/1
975 TABLET ORAL ONCE
Status: COMPLETED | OUTPATIENT
Start: 2025-05-09 | End: 2025-05-09

## 2025-05-09 RX ADMIN — ACETAMINOPHEN 975 MG: 325 TABLET, FILM COATED ORAL at 18:38

## 2025-05-09 RX ADMIN — KETOROLAC TROMETHAMINE 15 MG: 30 INJECTION, SOLUTION INTRAMUSCULAR; INTRAVENOUS at 18:38

## 2025-05-09 NOTE — Clinical Note
Sourav King was seen and treated in our emergency department on 5/9/2025.            no use of right arm until cleared by orthopedics    Diagnosis:     Sourav  .    He may return on this date:          If you have any questions or concerns, please don't hesitate to call.      Trinidad Meyer MD    ______________________________           _______________          _______________  Hospital Representative                              Date                                Time

## 2025-05-09 NOTE — ED PROVIDER NOTES
Time reflects when diagnosis was documented in both MDM as applicable and the Disposition within this note       Time User Action Codes Description Comment    5/9/2025  7:15 PM Trinidad Meyer Add [S46.211A] Rupture of right distal biceps tendon, initial encounter           ED Disposition       ED Disposition   Discharge    Condition   Stable    Date/Time   Fri May 9, 2025  7:15 PM    Comment   Sourav King discharge to home/self care.                   Assessment & Plan       Medical Decision Making  29 year old right-handed male presents for evaluation of right arm injury with heavy lifting.  Exam concerning for ruptured biceps tendon vs partial tear.  Xray ordered to r/o associated fracture.  Sling for comfort.  Pain well controlled after toradol and tylenol.  Ortho follow up.    Amount and/or Complexity of Data Reviewed  Radiology: ordered and independent interpretation performed.    Risk  OTC drugs.  Prescription drug management.             Medications   acetaminophen (TYLENOL) tablet 975 mg (975 mg Oral Given 5/9/25 1838)   ketorolac (TORADOL) injection 15 mg (15 mg Intramuscular Given 5/9/25 1838)       ED Risk Strat Scores                    No data recorded        SBIRT 22yo+      Flowsheet Row Most Recent Value   Initial Alcohol Screen: US AUDIT-C     1. How often do you have a drink containing alcohol? 1 Filed at: 05/09/2025 1756   2. How many drinks containing alcohol do you have on a typical day you are drinking?  1 Filed at: 05/09/2025 1756   3a. Male UNDER 65: How often do you have five or more drinks on one occasion? 0 Filed at: 05/09/2025 1756   3b. FEMALE Any Age, or MALE 65+: How often do you have 4 or more drinks on one occassion? 0 Filed at: 05/09/2025 1756   Audit-C Score 2 Filed at: 05/09/2025 1756   RODNEY: How many times in the past year have you...    Used an illegal drug or used a prescription medication for non-medical reasons? Never Filed at: 05/09/2025 1756                       "      History of Present Illness       Chief Complaint   Patient presents with    Arm Pain     Right arm injury while lifting a heavy tree, \"heard a pop\" in his bicep area.       Past Medical History:   Diagnosis Date    Hypertension     Malignant hyperthemia     Family history of malignant hyperthermia, pt never tested for it      Past Surgical History:   Procedure Laterality Date    EAR SURGERY      FRACTURE SURGERY Right     Rt foot Fx 5th metatarsal surgery 05/2017, with internal pin    TONSILLECTOMY        No family history on file.   Social History     Tobacco Use    Smoking status: Some Days     Types: Cigars     Passive exposure: Never    Smokeless tobacco: Former     Quit date: 6/20/2015    Tobacco comments:     Cigars once a week   Vaping Use    Vaping status: Never Used   Substance Use Topics    Alcohol use: Yes     Alcohol/week: 1.0 standard drink of alcohol     Types: 1 Standard drinks or equivalent per week     Comment: socially once a week    Drug use: No      E-Cigarette/Vaping    E-Cigarette Use Never User       E-Cigarette/Vaping Substances    Nicotine No     THC No     CBD No     Flavoring No     Other No     Unknown No       I have reviewed and agree with the history as documented.     29 year old right-handed male presents for evaluation of sudden onset of right arm pain which began at 5 pm when he attempted to lift a heavy waterlogged [place] tree that fell onto the road.  Patient states that both he and a bystander nearby heard a loud pop when he tried to lift the tree.  He has not been able to flex at the elbow since the incident.  Patient has not had anything for pain prior to arrival.      Arm Pain      Review of Systems        Objective       ED Triage Vitals   Temperature Pulse Blood Pressure Respirations SpO2 Patient Position - Orthostatic VS   05/09/25 1753 05/09/25 1754 05/09/25 1754 05/09/25 1754 05/09/25 1754 05/09/25 1754   99 °F (37.2 °C) 101 (!) 172/95 20 97 % Sitting      Temp " Source Heart Rate Source BP Location FiO2 (%) Pain Score    05/09/25 1753 -- 05/09/25 1754 -- 05/09/25 1838    Temporal  Right arm  10 - Worst Possible Pain      Vitals      Date and Time Temp Pulse SpO2 Resp BP Pain Score FACES Pain Rating User   05/09/25 1906 -- -- -- -- -- 5 -- CH   05/09/25 1838 -- -- -- -- -- 10 - Worst Possible Pain --    05/09/25 1754 -- 101 97 % 20 172/95 -- --    05/09/25 1753 99 °F (37.2 °C) -- -- -- -- -- --             Physical Exam  Vitals and nursing note reviewed.   HENT:      Head: Normocephalic and atraumatic.   Cardiovascular:      Rate and Rhythm: Normal rate and regular rhythm.      Pulses: Normal pulses.   Pulmonary:      Effort: Pulmonary effort is normal. No respiratory distress.   Musculoskeletal:        Arms:       Comments: Unable to palpate biceps tendon.  Patient unable to flex at the elbow.   Skin:     General: Skin is warm and dry.         Results Reviewed       None            XR elbow 3+ views RIGHT   ED Interpretation by Trinidad Meyer MD (05/09 1851)   No acute fracture or dislocation      Final Interpretation by Jimmy Allen MD (05/09 2049)      No acute osseous abnormality.         Computerized Assisted Algorithm (CAA) may have been used to analyze all applicable images.         Workstation performed: [id number]             Procedures    ED Medication and Procedure Management   Prior to Admission Medications   Prescriptions Last Dose Informant Patient Reported? Taking?   Ibuprofen (ADVIL PO)  Self Yes No   Sig: Take by mouth if needed   Patient not taking: Reported on 8/29/2024   benzonatate (TESSALON) 200 MG capsule  Self No No   Sig: Take 1 capsule (200 mg total) by mouth 3 (three) times a day as needed for cough   Patient not taking: Reported on 2/4/2025   carbamide peroxide (DEBROX) 6.5 % otic solution  Self No No   Sig: Administer 5 drops to the right ear 2 (two) times a day   Patient not taking: Reported on 5/30/2023   guaiFENesin  (ROBITUSSIN) 100 MG/5ML oral liquid  Self Yes No   Sig: Take 200 mg by mouth 3 (three) times a day as needed for cough   Patient not taking: Reported on 2/4/2025   lisinopril (ZESTRIL) 40 mg tablet   No No   Sig: Take 1 tablet by mouth once daily      Facility-Administered Medications: None     Discharge Medication List as of 5/9/2025  7:17 PM        START taking these medications    Details   methocarbamol (ROBAXIN) 500 mg tablet Take 1 tablet (500 mg total) by mouth every 8 (eight) hours as needed for muscle spasms, Starting Fri 5/9/2025, Normal           CONTINUE these medications which have NOT CHANGED    Details   benzonatate (TESSALON) 200 MG capsule Take 1 capsule (200 mg total) by mouth 3 (three) times a day as needed for cough, Starting Mon 10/28/2024, Normal      carbamide peroxide (DEBROX) 6.5 % otic solution Administer 5 drops to the right ear 2 (two) times a day, Starting Thu 3/30/2023, Normal      guaiFENesin (ROBITUSSIN) 100 MG/5ML oral liquid Take 200 mg by mouth 3 (three) times a day as needed for cough, Historical Med      Ibuprofen (ADVIL PO) Take by mouth if needed, Historical Med      lisinopril (ZESTRIL) 40 mg tablet Take 1 tablet by mouth once daily, Starting Mon 4/21/2025, Normal             ED SEPSIS DOCUMENTATION   Time reflects when diagnosis was documented in both MDM as applicable and the Disposition within this note       Time User Action Codes Description Comment    5/9/2025  7:15 PM Trinidad Meyer Add [S46.211A] Rupture of right distal biceps tendon, initial encounter                  Trinidad Meyer MD  05/09/25 2110

## 2025-05-09 NOTE — DISCHARGE INSTRUCTIONS
Take 1000 mg of acetaminophen (Tylenol) with 400 mg of ibuprofen (Advil) every 6-8 hours as needed for pain.  Lidocaine patches are available over-the-counter can be found in the back pain aisle of most pharmacies.  Look for 4% lidocaine in the list of the active ingredients.  These patches can be placed for 12 hours.  After 12 hours, discard the patch.  The next patch can be placed 12 hours later.

## 2025-05-12 ENCOUNTER — OFFICE VISIT (OUTPATIENT)
Dept: OBGYN CLINIC | Facility: CLINIC | Age: 30
End: 2025-05-12
Attending: EMERGENCY MEDICINE
Payer: COMMERCIAL

## 2025-05-12 VITALS — WEIGHT: 315 LBS | HEIGHT: 70 IN | BODY MASS INDEX: 45.1 KG/M2

## 2025-05-12 DIAGNOSIS — S46.211A RUPTURE OF RIGHT DISTAL BICEPS TENDON, INITIAL ENCOUNTER: Primary | ICD-10-CM

## 2025-05-12 PROCEDURE — 99204 OFFICE O/P NEW MOD 45 MIN: CPT | Performed by: ORTHOPAEDIC SURGERY

## 2025-05-12 NOTE — PROGRESS NOTES
Sports Medicine and Shoulder Surgery    Sourav King, 29 y.o. male   MRN# [id number]   : 1995          CHIEF COMPLAIN/REASON FOR VISIT  Chief Complaint   Patient presents with    Right Arm - Pain     Bicep, trying to lift a tree herd a pop moving it after 9inchs Friday        HISTORY OF PRESENT ILLNESS  Sourav King is a RHD 29 y.o. male who presents for evaluation of their right arm. Patient said 3 days ago he attempted to lift a tree when he felt a pop on his right arm. He describes pain in the antecubital region along with weakness of the arm.  Patient was seen by the ER in May 9, 2025 and had emergent pathologies ruled out and was given referral to orthopedics for further management.    REVIEW OF SYSTEMS  Review of systems was performed and, woutside that mentioned in the HPI, it was negative for symptomology related to the integumentary, hematologic, immunologic, allergic, neurologic, cardiovascular, respiratory, GI or  systems.     MEDICAL HISTORY  Patient Active Problem List   Diagnosis    Snoring    Witnessed episode of apnea    Lactose intolerance    Frequent bowel movements    Class 3 severe obesity due to excess calories with serious comorbidity and body mass index (BMI) of 50.0 to 59.9 in adult    Generalized abdominal pain    Primary hypertension    Attention and concentration deficit    Mixed hyperlipidemia    IFG (impaired fasting glucose)    Chronic bilateral low back pain without sciatica    Abnormal CBC    SUGAR (obstructive sleep apnea)    Does not have health insurance    Memory loss, short term    Hearing loss of right ear       SURGICAL HISTORY  Past Surgical History:   Procedure Laterality Date    EAR SURGERY      FRACTURE SURGERY Right     Rt foot Fx 5th metatarsal surgery 2017, with internal pin    TONSILLECTOMY         CURRENT MEDICATIONS    Current Outpatient Medications:     lisinopril (ZESTRIL) 40 mg tablet, Take 1 tablet by mouth once daily, Disp: 30 tablet, Rfl: 0     methocarbamol (ROBAXIN) 500 mg tablet, Take 1 tablet (500 mg total) by mouth every 8 (eight) hours as needed for muscle spasms, Disp: 20 tablet, Rfl: 0    benzonatate (TESSALON) 200 MG capsule, Take 1 capsule (200 mg total) by mouth 3 (three) times a day as needed for cough (Patient not taking: Reported on 2/4/2025), Disp: 20 capsule, Rfl: 0    carbamide peroxide (DEBROX) 6.5 % otic solution, Administer 5 drops to the right ear 2 (two) times a day (Patient not taking: Reported on 5/30/2023), Disp: 15 mL, Rfl: 0    guaiFENesin (ROBITUSSIN) 100 MG/5ML oral liquid, Take 200 mg by mouth 3 (three) times a day as needed for cough (Patient not taking: Reported on 2/4/2025), Disp: , Rfl:     Ibuprofen (ADVIL PO), Take by mouth if needed (Patient not taking: Reported on 8/29/2024), Disp: , Rfl:     SOCIAL HISTORY  Social History     Socioeconomic History    Marital status: /Civil Union     Spouse name: Not on file    Number of children: Not on file    Years of education: Not on file    Highest education level: Not on file   Occupational History    Not on file   Tobacco Use    Smoking status: Some Days     Types: Cigars     Passive exposure: Never    Smokeless tobacco: Former     Quit date: 6/20/2015    Tobacco comments:     Cigars once a week   Vaping Use    Vaping status: Never Used   Substance and Sexual Activity    Alcohol use: Yes     Alcohol/week: 1.0 standard drink of alcohol     Types: 1 Standard drinks or equivalent per week     Comment: socially once a week    Drug use: No    Sexual activity: Not on file   Other Topics Concern    Not on file   Social History Narrative    Not on file     Social Drivers of Health     Financial Resource Strain: Not on file   Food Insecurity: Not on file   Transportation Needs: Not on file   Physical Activity: Not on file   Stress: Not on file   Social Connections: Not on file   Intimate Partner Violence: Not At Risk (12/29/2022)    Humiliation, Afraid, Rape, and Kick  "questionnaire     Fear of Current or Ex-Partner: No     Emotionally Abused: No     Physically Abused: No     Sexually Abused: No   Housing Stability: Not on file       Objective     VITAL SIGNS  Ht 5' 10\" (1.778 m)   Wt (!) 163 kg (359 lb)   BMI 51.51 kg/m²      PHYSICAL EXAM  Musculoskeletal: Right Elbow     Skin Intact . Positive Hook test   TTP antecubital region              Instability Negative              ROM Limited secondary to pain   Ligamentously Stable   Compartments Soft/Compressible.   Sensation and motor function intact through radial/ulnar/median nerve distributions.               Radial pulse palpable     Ipsilateral shoulder, forearm, and hand demonstrate no obvious swelling, deformity, or other abnormality The patient has full ROM and stability these joints. There is no obvious tenderness to palpation throughout.      The contralateral upper extremity is negative for any tenderness to palpation. There is no deformity present. Patient is neurovascularly intact throughout.     RADIOGRAPHIC EXAMINATION/DIAGNOSTICS:  Procedure: XR elbow 3+ views RIGHT  Result Date: 5/9/2025  Narrative: XR ELBOW 3+ VW RIGHT INDICATION: injury. COMPARISON: None FINDINGS: No acute fracture or dislocation. No joint effusion. No significant degenerative changes. No lytic or blastic osseous lesion. Unremarkable soft tissues.     Impression: No acute osseous abnormality. Computerized Assisted Algorithm (CAA) may have been used to analyze all applicable images. Workstation performed: [id number]       Assessment & Plan  Rupture of right distal biceps tendon, initial encounter    Orders:    Ambulatory Referral to Orthopedic Surgery    MRI elbow right wo contrast; Future         History and clinical exam consistent with concern for soft tissue injury (distal biceps pathology). Plan to obtain MRI of the patient elbow to further evaluate soft tissue injury.  Depending on elbow MRI results, patient may be a potential candidate for " surgical management.  Patient will follow up to discuss results of the study and treatment plan. Recommend RICE and anti-inflammatories at this time.  They are to call with any other questions or concerns.    The complexity of the medical decision making, including the comprehensive history, detailed examination and moderate risk assessment, and time spent with patient supports coding at a Level 4 for this encounter

## 2025-05-13 ENCOUNTER — HOSPITAL ENCOUNTER (OUTPATIENT)
Dept: MRI IMAGING | Facility: HOSPITAL | Age: 30
Discharge: HOME | End: 2025-05-13
Payer: COMMERCIAL

## 2025-05-13 ENCOUNTER — OFFICE VISIT (OUTPATIENT)
Dept: OBGYN CLINIC | Facility: CLINIC | Age: 30
End: 2025-05-13
Payer: COMMERCIAL

## 2025-05-13 ENCOUNTER — APPOINTMENT (OUTPATIENT)
Dept: LAB | Age: 30
End: 2025-05-13
Payer: COMMERCIAL

## 2025-05-13 ENCOUNTER — TELEPHONE (OUTPATIENT)
Age: 30
End: 2025-05-13

## 2025-05-13 VITALS — WEIGHT: 315 LBS | BODY MASS INDEX: 45.1 KG/M2 | HEIGHT: 70 IN

## 2025-05-13 DIAGNOSIS — S46.211A RUPTURE OF RIGHT DISTAL BICEPS TENDON, INITIAL ENCOUNTER: Primary | ICD-10-CM

## 2025-05-13 DIAGNOSIS — S46.211A RUPTURE OF RIGHT DISTAL BICEPS TENDON, INITIAL ENCOUNTER: ICD-10-CM

## 2025-05-13 DIAGNOSIS — S46.211A RUPTURE OF DISTAL BICEPS TENDON, RIGHT, INITIAL ENCOUNTER: Primary | ICD-10-CM

## 2025-05-13 LAB
ALBUMIN SERPL BCG-MCNC: 4.2 G/DL (ref 3.5–5)
ALP SERPL-CCNC: 65 U/L (ref 34–104)
ALT SERPL W P-5'-P-CCNC: 35 U/L (ref 7–52)
ANION GAP SERPL CALCULATED.3IONS-SCNC: 12 MMOL/L (ref 4–13)
AST SERPL W P-5'-P-CCNC: 20 U/L (ref 13–39)
BASOPHILS # BLD AUTO: 0.05 THOUSANDS/ÂΜL (ref 0–0.1)
BASOPHILS NFR BLD AUTO: 0 % (ref 0–1)
BILIRUB SERPL-MCNC: 0.35 MG/DL (ref 0.2–1)
BUN SERPL-MCNC: 14 MG/DL (ref 5–25)
CALCIUM SERPL-MCNC: 9.5 MG/DL (ref 8.4–10.2)
CHLORIDE SERPL-SCNC: 101 MMOL/L (ref 96–108)
CO2 SERPL-SCNC: 24 MMOL/L (ref 21–32)
CREAT SERPL-MCNC: 0.64 MG/DL (ref 0.6–1.3)
EOSINOPHIL # BLD AUTO: 0.16 THOUSAND/ÂΜL (ref 0–0.61)
EOSINOPHIL NFR BLD AUTO: 1 % (ref 0–6)
ERYTHROCYTE [DISTWIDTH] IN BLOOD BY AUTOMATED COUNT: 13.3 % (ref 11.6–15.1)
GFR SERPL CREATININE-BSD FRML MDRD: 132 ML/MIN/1.73SQ M
GLUCOSE SERPL-MCNC: 99 MG/DL (ref 65–140)
HCT VFR BLD AUTO: 42.9 % (ref 36.5–49.3)
HGB BLD-MCNC: 13.6 G/DL (ref 12–17)
IMM GRANULOCYTES # BLD AUTO: 0.13 THOUSAND/UL (ref 0–0.2)
IMM GRANULOCYTES NFR BLD AUTO: 1 % (ref 0–2)
LYMPHOCYTES # BLD AUTO: 2.92 THOUSANDS/ÂΜL (ref 0.6–4.47)
LYMPHOCYTES NFR BLD AUTO: 22 % (ref 14–44)
MCH RBC QN AUTO: 26.4 PG (ref 26.8–34.3)
MCHC RBC AUTO-ENTMCNC: 31.7 G/DL (ref 31.4–37.4)
MCV RBC AUTO: 83 FL (ref 82–98)
MONOCYTES # BLD AUTO: 1.05 THOUSAND/ÂΜL (ref 0.17–1.22)
MONOCYTES NFR BLD AUTO: 8 % (ref 4–12)
NEUTROPHILS # BLD AUTO: 8.73 THOUSANDS/ÂΜL (ref 1.85–7.62)
NEUTS SEG NFR BLD AUTO: 68 % (ref 43–75)
NRBC BLD AUTO-RTO: 0 /100 WBCS
PLATELET # BLD AUTO: 364 THOUSANDS/UL (ref 149–390)
PMV BLD AUTO: 10.4 FL (ref 8.9–12.7)
POTASSIUM SERPL-SCNC: 4.3 MMOL/L (ref 3.5–5.3)
PROT SERPL-MCNC: 7.1 G/DL (ref 6.4–8.4)
RBC # BLD AUTO: 5.15 MILLION/UL (ref 3.88–5.62)
SODIUM SERPL-SCNC: 137 MMOL/L (ref 135–147)
WBC # BLD AUTO: 13.04 THOUSAND/UL (ref 4.31–10.16)

## 2025-05-13 PROCEDURE — 73221 MRI JOINT UPR EXTREM W/O DYE: CPT

## 2025-05-13 PROCEDURE — 85025 COMPLETE CBC W/AUTO DIFF WBC: CPT

## 2025-05-13 PROCEDURE — 36415 COLL VENOUS BLD VENIPUNCTURE: CPT

## 2025-05-13 PROCEDURE — 80053 COMPREHEN METABOLIC PANEL: CPT

## 2025-05-13 PROCEDURE — 99215 OFFICE O/P EST HI 40 MIN: CPT | Performed by: ORTHOPAEDIC SURGERY

## 2025-05-13 RX ORDER — TRANEXAMIC ACID 10 MG/ML
1000 INJECTION, SOLUTION INTRAVENOUS ONCE
Status: CANCELLED | OUTPATIENT
Start: 2025-05-22 | End: 2025-05-13

## 2025-05-13 RX ORDER — CHLORHEXIDINE GLUCONATE ORAL RINSE 1.2 MG/ML
15 SOLUTION DENTAL ONCE
Status: CANCELLED | OUTPATIENT
Start: 2025-05-22 | End: 2025-05-13

## 2025-05-13 RX ORDER — CEFAZOLIN SODIUM 2 G/50ML
2000 SOLUTION INTRAVENOUS ONCE
Status: CANCELLED | OUTPATIENT
Start: 2025-05-22 | End: 2025-05-13

## 2025-05-13 NOTE — H&P (VIEW-ONLY)
Sports Medicine and Shoulder Surgery    Sourav King, 29 y.o. male   MRN# [id number]   : 1995          CHIEF COMPLAINT / REASON FOR VISIT  Sourav King is a 29 y.o. who is following up today to discuss the results of his recent imaging study.    HISTORY OF PRESENT ILLNESS  Patient reports they are doing better from when we last saw them.     OBJECTIVE  Right Elbow    PHYSICAL EXAM  Musculoskeletal: Right Elbow                Skin Intact . Positive Hook test              TTP antecubital region              Instability Negative              ROM Limited secondary to pain   Ligamentously Stable              Compartments Soft/Compressible.              Sensation and motor function intact through radial/ulnar/median nerve distributions.               Radial pulse palpable      Ipsilateral shoulder, forearm, and hand demonstrate no obvious swelling, deformity, or other abnormality The patient has full ROM and stability these joints. There is no obvious tenderness to palpation throughout.       The contralateral upper extremity is negative for any tenderness to palpation. There is no deformity present. Patient is neurovascularly intact throughout.     DIAGNOSTIC IMAGING:  Procedure: MRI elbow right wo contrast  Result Date: 2025  Narrative: MRI ELBOW RIGHT WO CONTRAST INDICATION:   S46.211A: Strain of muscle, fascia and tendon of other parts of biceps, right arm, initial encounter. COMPARISON: 2025 TECHNIQUE:  Multiplanar/multisequence MR of the right elbow was performed. FINDINGS: Subcutaneous Tissues: Mild anterior edema. Joint Effusion: None. TENDONS Biceps: Complete tear of the distal biceps tendon from its insertion, retracted by approximately 6 cm. Brachialis: Intact. Triceps: Intact. Common flexor: Intact. Common extensor: There is mild increased signal/thickening in the common extensor origin consistent with mild lateral epicondylitis, without tear.. LIGAMENTS Radial collateral ligament: Intact  Ulnar collateral ligament: Intact. Lateral ulnar collateral ligament: Intact. Annular ligament: Intact. Cubital Tunnel/Ulnar Nerve: Intact. Radial Nerve: Intact. Articular Surfaces: Intact. Bones: Intact. Musculature: Intact.     Impression: Complete tear of the distal biceps tendon from its insertion, retracted by approximately 6 cm Mild lateral epicondylitis. Workstation performed: [id number]     Procedure: XR elbow 3+ views RIGHT  Result Date: 5/9/2025  Narrative: XR ELBOW 3+ VW RIGHT INDICATION: injury. COMPARISON: None FINDINGS: No acute fracture or dislocation. No joint effusion. No significant degenerative changes. No lytic or blastic osseous lesion. Unremarkable soft tissues.     Impression: No acute osseous abnormality. Computerized Assisted Algorithm (CAA) may have been used to analyze all applicable images. Workstation performed: [id number]       Assessment & Plan  Rupture of right distal biceps tendon, initial encounter    Orders:    Case request operating room: Distal biceps repair, REPAIR TENDON BICEPS; Standing    Comprehensive metabolic panel; Future    CBC and differential; Future         Today we discussed the importance of weight management and how can impact his recovery and long-term outcomes.  Excessive body recommended to additional stress on the joints and soft tissues, potentially slowing healing increase the risk of complications.  We discussed sustainable approach weight loss patient voiced his understanding and implications on proceeding with surgery.

## 2025-05-13 NOTE — TELEPHONE ENCOUNTER
Caller: Patient     Doctor: Dr. Barriga    Reason for call: patient is calling regarding his MRI results. He stated he saw the results in his MyChart and asking what the next steps are. I did schedule him a follow up for this afternoon to review the results. He would like to know if that is necessary.    Call back#: [phone]

## 2025-05-13 NOTE — PROGRESS NOTES
Sports Medicine and Shoulder Surgery    Sourav King, 29 y.o. male   MRN# [id number]   : 1995          CHIEF COMPLAINT / REASON FOR VISIT  Sourav King is a 29 y.o. who is following up today to discuss the results of his recent imaging study.    HISTORY OF PRESENT ILLNESS  Patient reports they are doing better from when we last saw them.     OBJECTIVE  Right Elbow    PHYSICAL EXAM  Musculoskeletal: Right Elbow                Skin Intact . Positive Hook test              TTP antecubital region              Instability Negative              ROM Limited secondary to pain   Ligamentously Stable              Compartments Soft/Compressible.              Sensation and motor function intact through radial/ulnar/median nerve distributions.               Radial pulse palpable      Ipsilateral shoulder, forearm, and hand demonstrate no obvious swelling, deformity, or other abnormality The patient has full ROM and stability these joints. There is no obvious tenderness to palpation throughout.       The contralateral upper extremity is negative for any tenderness to palpation. There is no deformity present. Patient is neurovascularly intact throughout.     DIAGNOSTIC IMAGING:  Procedure: MRI elbow right wo contrast  Result Date: 2025  Narrative: MRI ELBOW RIGHT WO CONTRAST INDICATION:   S46.211A: Strain of muscle, fascia and tendon of other parts of biceps, right arm, initial encounter. COMPARISON: 2025 TECHNIQUE:  Multiplanar/multisequence MR of the right elbow was performed. FINDINGS: Subcutaneous Tissues: Mild anterior edema. Joint Effusion: None. TENDONS Biceps: Complete tear of the distal biceps tendon from its insertion, retracted by approximately 6 cm. Brachialis: Intact. Triceps: Intact. Common flexor: Intact. Common extensor: There is mild increased signal/thickening in the common extensor origin consistent with mild lateral epicondylitis, without tear.. LIGAMENTS Radial collateral ligament: Intact  Ulnar collateral ligament: Intact. Lateral ulnar collateral ligament: Intact. Annular ligament: Intact. Cubital Tunnel/Ulnar Nerve: Intact. Radial Nerve: Intact. Articular Surfaces: Intact. Bones: Intact. Musculature: Intact.     Impression: Complete tear of the distal biceps tendon from its insertion, retracted by approximately 6 cm Mild lateral epicondylitis. Workstation performed: [id number]     Procedure: XR elbow 3+ views RIGHT  Result Date: 5/9/2025  Narrative: XR ELBOW 3+ VW RIGHT INDICATION: injury. COMPARISON: None FINDINGS: No acute fracture or dislocation. No joint effusion. No significant degenerative changes. No lytic or blastic osseous lesion. Unremarkable soft tissues.     Impression: No acute osseous abnormality. Computerized Assisted Algorithm (CAA) may have been used to analyze all applicable images. Workstation performed: [id number]       Assessment & Plan  Rupture of right distal biceps tendon, initial encounter    Orders:    Case request operating room: Distal biceps repair, REPAIR TENDON BICEPS; Standing    Comprehensive metabolic panel; Future    CBC and differential; Future         Today we discussed the importance of weight management and how can impact his recovery and long-term outcomes.  Excessive body recommended to additional stress on the joints and soft tissues, potentially slowing healing increase the risk of complications.  We discussed sustainable approach weight loss patient voiced his understanding and implications on proceeding with surgery.

## 2025-05-14 ENCOUNTER — ANESTHESIA EVENT (OUTPATIENT)
Dept: PERIOP | Facility: HOSPITAL | Age: 30
End: 2025-05-14
Payer: COMMERCIAL

## 2025-05-14 NOTE — PRE-PROCEDURE INSTRUCTIONS
Pre-Surgery Instructions:   Medication Instructions    lisinopril (ZESTRIL) 40 mg tablet Take night before surgery    methocarbamol (ROBAXIN) 500 mg tablet Uses PRN- OK to take day of surgery   Medication instructions for day of surgery reviewed. Please take all instructed medications with only a sip of water.       You will receive a call one business day prior to surgery with an arrival time and hospital directions. If your surgery is scheduled on a Monday, the hospital will be calling you on the Friday prior to your surgery. If you have not heard from anyone by 8pm, please call the hospital supervisor through the hospital  at [phone]. ([place] [phone] or [place] [phone]).    Do not eat or drink anything after midnight the night before your surgery, including candy, mints, lifesavers, or chewing gum. Do not drink alcohol 24hrs before your surgery. Try not to smoke at least 24hrs before your surgery.       Follow the pre surgery showering instructions as listed in the “My Surgical Experience Booklet” or otherwise provided by your surgeon's office. Do not use a blade to shave the surgical area 1 week before surgery. It is okay to use a clean electric clippers up to 24 hours before surgery. Do not apply any lotions, creams, including makeup, cologne, deodorant, or perfumes after showering on the day of your surgery. Do not use dry shampoo, hair spray, hair gel, or any type of hair products.     No contact lenses, eye make-up, or artificial eyelashes. Remove nail polish, including gel polish, and any artificial, gel, or acrylic nails if possible. Remove all jewelry including rings and body piercing jewelry.     Wear causal clothing that is easy to take on and off. Consider your type of surgery.    Keep any valuables, jewelry, piercings at home. Please bring any specially ordered equipment (sling, braces) if indicated.    Arrange for a responsible person to drive you to and from the hospital on  the day of your surgery. Please confirm the visitor policy for the day of your procedure when you receive your phone call with an arrival time.     Call the surgeon's office with any new illnesses, exposures, or additional questions prior to surgery.    Please reference your “My Surgical Experience Booklet” for additional information to prepare for your upcoming surgery.

## 2025-05-15 ENCOUNTER — TELEPHONE (OUTPATIENT)
Dept: OBGYN CLINIC | Facility: CLINIC | Age: 30
End: 2025-05-15

## 2025-05-15 NOTE — TELEPHONE ENCOUNTER
Called and spoke with patient to inform surgery needs to be moved to [facility]). Informed patient that surgery moved to 5/22/25 at [facility].Gave address. Appts stay the same. Patient understood.y

## 2025-05-16 ENCOUNTER — OFFICE VISIT (OUTPATIENT)
Dept: FAMILY MEDICINE CLINIC | Facility: CLINIC | Age: 30
End: 2025-05-16
Payer: COMMERCIAL

## 2025-05-16 VITALS
SYSTOLIC BLOOD PRESSURE: 148 MMHG | RESPIRATION RATE: 16 BRPM | DIASTOLIC BLOOD PRESSURE: 82 MMHG | TEMPERATURE: 98.9 F | OXYGEN SATURATION: 97 % | BODY MASS INDEX: 45.1 KG/M2 | WEIGHT: 315 LBS | HEART RATE: 106 BPM | HEIGHT: 70 IN

## 2025-05-16 DIAGNOSIS — I10 PRIMARY HYPERTENSION: Primary | ICD-10-CM

## 2025-05-16 DIAGNOSIS — H66.001 NON-RECURRENT ACUTE SUPPURATIVE OTITIS MEDIA OF RIGHT EAR WITHOUT SPONTANEOUS RUPTURE OF TYMPANIC MEMBRANE: ICD-10-CM

## 2025-05-16 DIAGNOSIS — S46.211A RUPTURE OF DISTAL BICEPS TENDON, RIGHT, INITIAL ENCOUNTER: ICD-10-CM

## 2025-05-16 DIAGNOSIS — R73.01 IFG (IMPAIRED FASTING GLUCOSE): ICD-10-CM

## 2025-05-16 PROCEDURE — 99214 OFFICE O/P EST MOD 30 MIN: CPT | Performed by: FAMILY MEDICINE

## 2025-05-16 RX ORDER — AZITHROMYCIN 250 MG/1
TABLET, FILM COATED ORAL
Qty: 6 TABLET | Refills: 0 | Status: SHIPPED | OUTPATIENT
Start: 2025-05-16 | End: 2025-05-20

## 2025-05-16 NOTE — PROGRESS NOTES
Sourav King 1995 male MRN: [id number]    Family Medicine Follow-up Visit    ASSESSMENT/PLAN  Problem List Items Addressed This Visit          Cardiovascular and Mediastinum    Primary hypertension - Primary    Controlled on lisinopril 40 mg daily   Continue as prescribed   Slightly elevated today likely due to pain from ortho issues  Will continue to monitor             Endocrine    IFG (impaired fasting glucose)    Fasting sugar was normal on last cmp  Will continue to monitor             Musculoskeletal and Integument    Rupture of distal biceps tendon, right, initial encounter    He is following with orthopedics  Surgery is planned for 5/22            Other Visit Diagnoses         Non-recurrent acute suppurative otitis media of right ear without spontaneous rupture of tympanic membrane        Relevant Medications    azithromycin (ZITHROMAX) 250 mg tablet            Follow up in September for CP or sooner if needed          Future Appointments   Date Time Provider Department Center   6/3/2025 11:15 AM Dioni Barriga MD [facility]   8/5/2025  8:00 AM SARA Vazquez          SUBJECTIVE  CC: Hypertension and Follow-up (Review labs)      HPI:  Sourav King is a 29 y.o. male who presents for check up    HPI    Review of Systems   Constitutional:  Negative for chills and fever.   HENT:  Negative for congestion, postnasal drip, rhinorrhea and sinus pain.    Eyes:  Negative for photophobia and visual disturbance.   Respiratory:  Negative for cough and shortness of breath.    Cardiovascular:  Negative for chest pain, palpitations and leg swelling.   Gastrointestinal:  Negative for abdominal pain, constipation, diarrhea, nausea and vomiting.   Genitourinary:  Negative for difficulty urinating and dysuria.   Musculoskeletal:  Negative for arthralgias and myalgias.   Skin:  Negative for rash.   Neurological:  Negative for dizziness and syncope.       Historical Information  "  The patient history was reviewed as follows:    Past Medical History:   Diagnosis Date    CPAP (continuous positive airway pressure) dependence     Hypertension     Malignant hyperthemia     Family history of malignant hyperthermia, pt never tested for it    Sleep apnea      Past Surgical History:   Procedure Laterality Date    EAR SURGERY      FRACTURE SURGERY Right     Rt foot Fx 5th metatarsal surgery 05/2017, with internal pin    TONSILLECTOMY      VASECTOMY       History reviewed. No pertinent family history.   Social History   Social History     Substance and Sexual Activity   Alcohol Use Yes    Alcohol/week: 1.0 standard drink of alcohol    Types: 1 Standard drinks or equivalent per week    Comment: socially once a week     Social History     Substance and Sexual Activity   Drug Use No     Tobacco Use History[1]    Medications:   Current Medications[2]  Allergies[3]    OBJECTIVE    Vitals:   Vitals:    05/16/25 0805   BP: 148/82   BP Location: Left arm   Patient Position: Sitting   Cuff Size: Large   Pulse: (!) 106   Resp: 16   Temp: 98.9 °F (37.2 °C)   TempSrc: Tympanic   SpO2: 97%   Weight: (!) 166 kg (366 lb 3.2 oz)   Height: 5' 10\" (1.778 m)           Physical Exam  Constitutional:       Appearance: He is well-developed.   HENT:      Head: Normocephalic and atraumatic.      Right Ear: External ear normal. Tympanic membrane is erythematous.      Left Ear: External ear normal.     Eyes:      Conjunctiva/sclera: Conjunctivae normal.      Pupils: Pupils are equal, round, and reactive to light.       Cardiovascular:      Rate and Rhythm: Normal rate and regular rhythm.      Heart sounds: Normal heart sounds. No murmur heard.  Pulmonary:      Effort: Pulmonary effort is normal. No respiratory distress.      Breath sounds: Normal breath sounds. No wheezing.   Abdominal:      General: Bowel sounds are normal.     Musculoskeletal:         General: Normal range of motion.      Cervical back: Normal range of " motion and neck supple.      Comments: Right arm in sling      Skin:     General: Skin is warm and dry.     Neurological:      Mental Status: He is alert and oriented to person, place, and time.     Psychiatric:         Behavior: Behavior normal.            Labs:        Roxane HowellpreethiignacioDO    5/16/2025           [1]   Social History  Tobacco Use   Smoking Status Some Days    Types: Cigars    Passive exposure: Never   Smokeless Tobacco Former    Quit date: 6/20/2015   Tobacco Comments    Cigars once a week   [2]   Current Outpatient Medications:     azithromycin (ZITHROMAX) 250 mg tablet, Take 2 tablets today then 1 tablet daily x 4 days, Disp: 6 tablet, Rfl: 0    lisinopril (ZESTRIL) 40 mg tablet, Take 1 tablet by mouth once daily, Disp: 30 tablet, Rfl: 0    methocarbamol (ROBAXIN) 500 mg tablet, Take 1 tablet (500 mg total) by mouth every 8 (eight) hours as needed for muscle spasms, Disp: 20 tablet, Rfl: 0    benzonatate (TESSALON) 200 MG capsule, Take 1 capsule (200 mg total) by mouth 3 (three) times a day as needed for cough (Patient not taking: Reported on 2/4/2025), Disp: 20 capsule, Rfl: 0    carbamide peroxide (DEBROX) 6.5 % otic solution, Administer 5 drops to the right ear 2 (two) times a day (Patient not taking: Reported on 5/30/2023), Disp: 15 mL, Rfl: 0    guaiFENesin (ROBITUSSIN) 100 MG/5ML oral liquid, Take 200 mg by mouth 3 (three) times a day as needed for cough (Patient not taking: Reported on 2/4/2025), Disp: , Rfl:     Ibuprofen (ADVIL PO), Take by mouth if needed (Patient not taking: Reported on 8/29/2024), Disp: , Rfl:   [3]   Allergies  Allergen Reactions    [place] [Fish Oil - Food Allergy] Anaphylaxis    Nuts - Food Allergy Throat Swelling     PISTACHIOS and PEANUTS only    Other      Mold and pollen    Latex Rash

## 2025-05-16 NOTE — ASSESSMENT & PLAN NOTE
Controlled on lisinopril 40 mg daily   Continue as prescribed   Slightly elevated today likely due to pain from ortho issues  Will continue to monitor

## 2025-05-20 DIAGNOSIS — I10 PRIMARY HYPERTENSION: ICD-10-CM

## 2025-05-20 RX ORDER — LISINOPRIL 40 MG/1
40 TABLET ORAL DAILY
Qty: 30 TABLET | Refills: 5 | Status: SHIPPED | OUTPATIENT
Start: 2025-05-20

## 2025-05-21 ENCOUNTER — ANESTHESIA (OUTPATIENT)
Dept: PERIOP | Facility: HOSPITAL | Age: 30
End: 2025-05-21
Payer: COMMERCIAL

## 2025-05-22 ENCOUNTER — APPOINTMENT (OUTPATIENT)
Dept: RADIOLOGY | Facility: HOSPITAL | Age: 30
End: 2025-05-22
Payer: COMMERCIAL

## 2025-05-22 ENCOUNTER — HOSPITAL ENCOUNTER (OUTPATIENT)
Facility: HOSPITAL | Age: 30
Setting detail: OUTPATIENT SURGERY
Discharge: HOME | End: 2025-05-22
Attending: ORTHOPAEDIC SURGERY | Admitting: ORTHOPAEDIC SURGERY
Payer: COMMERCIAL

## 2025-05-22 VITALS
HEART RATE: 94 BPM | TEMPERATURE: 97.5 F | RESPIRATION RATE: 18 BRPM | HEIGHT: 70 IN | OXYGEN SATURATION: 93 % | SYSTOLIC BLOOD PRESSURE: 120 MMHG | DIASTOLIC BLOOD PRESSURE: 64 MMHG | WEIGHT: 315 LBS | BODY MASS INDEX: 45.1 KG/M2

## 2025-05-22 DIAGNOSIS — S46.211A RUPTURE OF DISTAL BICEPS TENDON, RIGHT, INITIAL ENCOUNTER: Primary | ICD-10-CM

## 2025-05-22 PROCEDURE — 24342 REPAIR OF RUPTURED TENDON: CPT

## 2025-05-22 PROCEDURE — 73090 X-RAY EXAM OF FOREARM: CPT

## 2025-05-22 PROCEDURE — C1713 ANCHOR/SCREW BN/BN,TIS/BN: HCPCS | Performed by: ORTHOPAEDIC SURGERY

## 2025-05-22 PROCEDURE — 24342 REPAIR OF RUPTURED TENDON: CPT | Performed by: ORTHOPAEDIC SURGERY

## 2025-05-22 DEVICE — IMPL DELIVERY SYS,DISTAL BICEPS REPR
Type: IMPLANTABLE DEVICE | Site: ARM | Status: FUNCTIONAL
Brand: ARTHREX®

## 2025-05-22 RX ORDER — ALBUTEROL SULFATE 90 UG/1
INHALANT RESPIRATORY (INHALATION) AS NEEDED
Status: DISCONTINUED | OUTPATIENT
Start: 2025-05-22 | End: 2025-05-22

## 2025-05-22 RX ORDER — MAGNESIUM HYDROXIDE 1200 MG/15ML
LIQUID ORAL AS NEEDED
Status: DISCONTINUED | OUTPATIENT
Start: 2025-05-22 | End: 2025-05-22 | Stop reason: HOSPADM

## 2025-05-22 RX ORDER — GLYCOPYRROLATE 0.2 MG/ML
INJECTION INTRAMUSCULAR; INTRAVENOUS AS NEEDED
Status: DISCONTINUED | OUTPATIENT
Start: 2025-05-22 | End: 2025-05-22

## 2025-05-22 RX ORDER — SODIUM CHLORIDE, SODIUM LACTATE, POTASSIUM CHLORIDE, CALCIUM CHLORIDE 600; 310; 30; 20 MG/100ML; MG/100ML; MG/100ML; MG/100ML
20 INJECTION, SOLUTION INTRAVENOUS CONTINUOUS
Status: DISCONTINUED | OUTPATIENT
Start: 2025-05-22 | End: 2025-05-22 | Stop reason: HOSPADM

## 2025-05-22 RX ORDER — CEFAZOLIN SODIUM 1 G/3ML
INJECTION, POWDER, FOR SOLUTION INTRAMUSCULAR; INTRAVENOUS AS NEEDED
Status: DISCONTINUED | OUTPATIENT
Start: 2025-05-22 | End: 2025-05-22

## 2025-05-22 RX ORDER — ONDANSETRON 2 MG/ML
INJECTION INTRAMUSCULAR; INTRAVENOUS AS NEEDED
Status: DISCONTINUED | OUTPATIENT
Start: 2025-05-22 | End: 2025-05-22

## 2025-05-22 RX ORDER — LIDOCAINE HYDROCHLORIDE 20 MG/ML
INJECTION, SOLUTION EPIDURAL; INFILTRATION; INTRACAUDAL; PERINEURAL AS NEEDED
Status: DISCONTINUED | OUTPATIENT
Start: 2025-05-22 | End: 2025-05-22

## 2025-05-22 RX ORDER — MIDAZOLAM HYDROCHLORIDE 2 MG/2ML
INJECTION, SOLUTION INTRAMUSCULAR; INTRAVENOUS
Status: COMPLETED | OUTPATIENT
Start: 2025-05-22 | End: 2025-05-22

## 2025-05-22 RX ORDER — MEPERIDINE HYDROCHLORIDE 25 MG/ML
12.5 INJECTION INTRAMUSCULAR; INTRAVENOUS; SUBCUTANEOUS
Status: DISCONTINUED | OUTPATIENT
Start: 2025-05-22 | End: 2025-05-22 | Stop reason: HOSPADM

## 2025-05-22 RX ORDER — OXYCODONE HYDROCHLORIDE 5 MG/1
5 TABLET ORAL EVERY 4 HOURS PRN
Qty: 20 TABLET | Refills: 0 | Status: SHIPPED | OUTPATIENT
Start: 2025-05-22

## 2025-05-22 RX ORDER — ALBUTEROL SULFATE 0.83 MG/ML
2.5 SOLUTION RESPIRATORY (INHALATION) ONCE
Status: DISCONTINUED | OUTPATIENT
Start: 2025-05-22 | End: 2025-05-22 | Stop reason: HOSPADM

## 2025-05-22 RX ORDER — NAPROXEN 500 MG/1
500 TABLET ORAL 2 TIMES DAILY WITH MEALS
Qty: 42 TABLET | Refills: 0 | Status: SHIPPED | OUTPATIENT
Start: 2025-05-22

## 2025-05-22 RX ORDER — FENTANYL CITRATE 50 UG/ML
INJECTION, SOLUTION INTRAMUSCULAR; INTRAVENOUS
Status: COMPLETED | OUTPATIENT
Start: 2025-05-22 | End: 2025-05-22

## 2025-05-22 RX ORDER — SODIUM CHLORIDE, SODIUM LACTATE, POTASSIUM CHLORIDE, CALCIUM CHLORIDE 600; 310; 30; 20 MG/100ML; MG/100ML; MG/100ML; MG/100ML
125 INJECTION, SOLUTION INTRAVENOUS CONTINUOUS
Status: DISCONTINUED | OUTPATIENT
Start: 2025-05-22 | End: 2025-05-22 | Stop reason: HOSPADM

## 2025-05-22 RX ORDER — ASPIRIN 81 MG/1
81 TABLET ORAL 2 TIMES DAILY
Qty: 60 TABLET | Refills: 0 | Status: CANCELLED | OUTPATIENT
Start: 2025-05-22

## 2025-05-22 RX ORDER — ONDANSETRON 2 MG/ML
4 INJECTION INTRAMUSCULAR; INTRAVENOUS ONCE AS NEEDED
Status: DISCONTINUED | OUTPATIENT
Start: 2025-05-22 | End: 2025-05-22 | Stop reason: HOSPADM

## 2025-05-22 RX ORDER — PROPOFOL 10 MG/ML
INJECTION, EMULSION INTRAVENOUS AS NEEDED
Status: DISCONTINUED | OUTPATIENT
Start: 2025-05-22 | End: 2025-05-22

## 2025-05-22 RX ORDER — ONDANSETRON 2 MG/ML
4 INJECTION INTRAMUSCULAR; INTRAVENOUS EVERY 6 HOURS PRN
Status: DISCONTINUED | OUTPATIENT
Start: 2025-05-22 | End: 2025-05-22 | Stop reason: HOSPADM

## 2025-05-22 RX ORDER — OXYCODONE HYDROCHLORIDE 5 MG/1
5 TABLET ORAL EVERY 4 HOURS PRN
Refills: 0 | Status: DISCONTINUED | OUTPATIENT
Start: 2025-05-22 | End: 2025-05-22 | Stop reason: HOSPADM

## 2025-05-22 RX ORDER — CHLORHEXIDINE GLUCONATE ORAL RINSE 1.2 MG/ML
15 SOLUTION DENTAL ONCE
Status: COMPLETED | OUTPATIENT
Start: 2025-05-22 | End: 2025-05-22

## 2025-05-22 RX ORDER — HYDROMORPHONE HCL/PF 1 MG/ML
0.5 SYRINGE (ML) INJECTION
Status: DISCONTINUED | OUTPATIENT
Start: 2025-05-22 | End: 2025-05-22 | Stop reason: HOSPADM

## 2025-05-22 RX ORDER — FENTANYL CITRATE/PF 50 MCG/ML
25 SYRINGE (ML) INJECTION
Status: DISCONTINUED | OUTPATIENT
Start: 2025-05-22 | End: 2025-05-22 | Stop reason: HOSPADM

## 2025-05-22 RX ORDER — CEFAZOLIN SODIUM 2 G/50ML
2000 SOLUTION INTRAVENOUS ONCE
Status: COMPLETED | OUTPATIENT
Start: 2025-05-22 | End: 2025-05-22

## 2025-05-22 RX ORDER — ACETAMINOPHEN 325 MG/1
650 TABLET ORAL EVERY 6 HOURS PRN
Status: DISCONTINUED | OUTPATIENT
Start: 2025-05-22 | End: 2025-05-22 | Stop reason: HOSPADM

## 2025-05-22 RX ORDER — ACETAMINOPHEN 325 MG/1
325 TABLET ORAL EVERY 6 HOURS PRN
Qty: 60 TABLET | Refills: 0 | Status: SHIPPED | OUTPATIENT
Start: 2025-05-22

## 2025-05-22 RX ORDER — ROCURONIUM BROMIDE 10 MG/ML
INJECTION, SOLUTION INTRAVENOUS AS NEEDED
Status: DISCONTINUED | OUTPATIENT
Start: 2025-05-22 | End: 2025-05-22

## 2025-05-22 RX ORDER — PHENYLEPHRINE HCL IN 0.9% NACL 1 MG/10 ML
SYRINGE (ML) INTRAVENOUS AS NEEDED
Status: DISCONTINUED | OUTPATIENT
Start: 2025-05-22 | End: 2025-05-22

## 2025-05-22 RX ORDER — BUPIVACAINE HYDROCHLORIDE 5 MG/ML
INJECTION, SOLUTION EPIDURAL; INTRACAUDAL; PERINEURAL
Status: COMPLETED | OUTPATIENT
Start: 2025-05-22 | End: 2025-05-22

## 2025-05-22 RX ORDER — TRANEXAMIC ACID 10 MG/ML
1000 INJECTION, SOLUTION INTRAVENOUS ONCE
Status: DISCONTINUED | OUTPATIENT
Start: 2025-05-22 | End: 2025-05-22 | Stop reason: HOSPADM

## 2025-05-22 RX ADMIN — FENTANYL CITRATE 100 MCG: 50 INJECTION INTRAMUSCULAR; INTRAVENOUS at 12:48

## 2025-05-22 RX ADMIN — Medication 150 MCG: at 13:42

## 2025-05-22 RX ADMIN — SODIUM CHLORIDE 0.2 MCG/KG/MIN: 9 INJECTION, SOLUTION INTRAVENOUS at 13:23

## 2025-05-22 RX ADMIN — LIDOCAINE HYDROCHLORIDE 100 MG: 20 INJECTION, SOLUTION EPIDURAL; INFILTRATION; INTRACAUDAL at 13:21

## 2025-05-22 RX ADMIN — ALBUTEROL SULFATE 8 PUFF: 90 AEROSOL, METERED RESPIRATORY (INHALATION) at 13:27

## 2025-05-22 RX ADMIN — PROPOFOL 120 MCG/KG/MIN: 10 INJECTION, EMULSION INTRAVENOUS at 13:23

## 2025-05-22 RX ADMIN — ONDANSETRON 4 MG: 2 INJECTION INTRAMUSCULAR; INTRAVENOUS at 14:21

## 2025-05-22 RX ADMIN — BUPIVACAINE 10 ML: 13.3 INJECTION, SUSPENSION, LIPOSOMAL INFILTRATION at 13:00

## 2025-05-22 RX ADMIN — PROPOFOL 300 MG: 10 INJECTION, EMULSION INTRAVENOUS at 13:21

## 2025-05-22 RX ADMIN — ROCURONIUM 60 MG: 50 INJECTION, SOLUTION INTRAVENOUS at 13:21

## 2025-05-22 RX ADMIN — Medication 200 MCG: at 14:16

## 2025-05-22 RX ADMIN — GLYCOPYRROLATE 0.4 MG: 0.2 INJECTION, SOLUTION INTRAMUSCULAR; INTRAVENOUS at 14:32

## 2025-05-22 RX ADMIN — Medication 200 MCG: at 14:25

## 2025-05-22 RX ADMIN — MIDAZOLAM 4 MG: 1 INJECTION INTRAMUSCULAR; INTRAVENOUS at 12:48

## 2025-05-22 RX ADMIN — Medication 150 MCG: at 14:28

## 2025-05-22 RX ADMIN — CEFAZOLIN 1000 MG: 1 INJECTION, POWDER, FOR SOLUTION INTRAMUSCULAR; INTRAVENOUS; PARENTERAL at 13:31

## 2025-05-22 RX ADMIN — Medication 200 MCG: at 14:05

## 2025-05-22 RX ADMIN — BUPIVACAINE HYDROCHLORIDE 10 ML: 5 INJECTION, SOLUTION EPIDURAL; INTRACAUDAL; PERINEURAL at 13:00

## 2025-05-22 RX ADMIN — CEFAZOLIN SODIUM 2000 MG: 2 SOLUTION INTRAVENOUS at 13:26

## 2025-05-22 RX ADMIN — SUGAMMADEX 400 MG: 100 INJECTION, SOLUTION INTRAVENOUS at 14:26

## 2025-05-22 RX ADMIN — SODIUM CHLORIDE, SODIUM LACTATE, POTASSIUM CHLORIDE, AND CALCIUM CHLORIDE 125 ML/HR: .6; .31; .03; .02 INJECTION, SOLUTION INTRAVENOUS at 10:03

## 2025-05-22 RX ADMIN — CHLORHEXIDINE GLUCONATE 15 ML: 1.2 SOLUTION ORAL at 09:54

## 2025-05-22 NOTE — ANESTHESIA PROCEDURE NOTES
Peripheral Block    Patient location during procedure: holding area  Start time: 5/22/2025 1:00 PM  Reason for block: at surgeon's request and post-op pain management  Staffing  Performed by: Donn Handley DO  Authorized by: Donn Handley DO    Preanesthetic Checklist  Completed: patient identified, IV checked, site marked, risks and benefits discussed, surgical consent, monitors and equipment checked, pre-op evaluation and timeout performed  Peripheral Block  Patient position: supine  Prep: ChloraPrep  Patient monitoring: continuous pulse oximetry, frequent blood pressure checks and heart rate  Block type: Interscalene  Laterality: right  Injection technique: single-shot  Procedures: ultrasound guided, Ultrasound guidance required for the procedure to increase accuracy and safety of medication placement and decrease risk of complications. and nerve stimulator  Ultrasound permanent image saved  bupivacaine (PF) (MARCAINE) 0.5 % injection 20 mL - Perineural   10 mL - 5/22/2025 1:00:00 PM  bupivacaine liposomal (EXPAREL) 1.3 % injection 20 mL - Perineural   10 mL - 5/22/2025 1:00:00 PM  midazolam (VERSED) injection 0.5 mg - Intravenous   4 mg - 5/22/2025 12:48:00 PM  fentanyl citrate (PF) 100 MCG/2ML 50 mcg - Intravenous   100 mcg - 5/22/2025 12:48:00 PM  Needle  Needle type: Stimuplex   Needle gauge: 20 G  Needle length: 4 in  Needle localization: anatomical landmarks, nerve stimulator and ultrasound guidance  Assessment  Injection assessment: frequent aspiration, injected with ease, negative aspiration, no paresthesia on injection, incremental injection, needle tip visualized at all times, negative for heart rate change and no symptoms of intraneural/intravenous injection  Paresthesia pain: none  Post-procedure:  site cleaned  patient tolerated the procedure well with no immediate complications

## 2025-05-22 NOTE — ANESTHESIA POSTPROCEDURE EVALUATION
Post-Op Assessment Note    CV Status:  Stable  Pain Score: 1    Pain management: adequate       Mental Status:  Alert and awake   Hydration Status:  Euvolemic   PONV Controlled:  Controlled   Airway Patency:  Patent     Post Op Vitals Reviewed: Yes    No anethesia notable event occurred.    Staff: Anesthesiologist           Last Filed PACU Vitals:  Vitals Value Taken Time   Temp     Pulse 104 05/22/25 16:02   /81 05/22/25 15:47   Resp 30 05/22/25 16:01   SpO2 92 % 05/22/25 16:00   Vitals shown include unfiled device data.    Modified Barbara:     Vitals Value Taken Time   Activity 2 05/22/25 15:32   Respiration 2 05/22/25 15:32   Circulation 2 05/22/25 15:32   Consciousness 2 05/22/25 15:32   Oxygen Saturation 2 05/22/25 15:32     Modified Barbara Score: 10

## 2025-05-22 NOTE — INTERVAL H&P NOTE
H&P reviewed. After examining the patient I find no changes in the patients condition since the H&P had been written.    Vitals:    05/22/25 0942   BP: 148/73   Pulse: 91   Resp: 16   Temp: 97.9 °F (36.6 °C)   SpO2: 96%

## 2025-05-22 NOTE — DISCHARGE INSTR - AVS FIRST PAGE
[facility] Orthopedic Patient  Information      Home Instructions after   Distal Biceps Repair            The following instructions are to be used for the first week after surgery or until you return to see the physician.   The most important concerns in the first week are controlling the inflammation and pain.      Icing your elbow You may use ice bags applied around your elbow for 20-30 minutes every 3-4 hours. Make sure the ice is not applied directly to the skin.    Caring for your dressing Keep your dressings and sling in place until your first postoperative visit.     Bathing/ Showering Do not get your incision wet until after your first post-operative visit. You may bathe, but make sure the dressing and wound  stays dry.  Do not soak the elbow, swim, or use a hot tub until given permission by Dr. Barriga.      Wearing your brace/sling Wear your splint and sling at all times until your first postoperative visit.             Taking your medication If you have been given a prescription for narcotic pain pills (oxycodone), please use them only for pain on the schedule and in the dosage indicated.    If you have been given a prescription for Naprosyn, a non steroidal anti-inflammatory drug (NSAID), you are to begin taking this the morning of the day after surgery. This medication is designed to help reduce your need for the narcotic pain medicine and assist in making you more comfortable. You may also take Tylenol for pain, a script was sent to your pharmacy. Do NOT take any additional pain related medications you may have such as Advil (Ibuprofen), or Aleve (Naproxen Sodium), etc. unless instructed to do so.             Follow-Up You should have already been scheduled for a follow-up  appointment to see Dr. Barriga in his office. If not, please call the office.   Reasons for Concern             If you have progressive pain that does not respond to pain  medication, a temperature over 101.5, excessive drainage,  or decreased sensation in your hand, contact the office.    Call 911 or go to the Emergency Room immediately if you experience any CHEST PAIN or SHORTNESS OF BREATH, as these symptoms can be a sign of a life threatening  condition.

## 2025-05-22 NOTE — ANESTHESIA PREPROCEDURE EVALUATION
Procedure:  Distal biceps repair (Right: Shoulder)  REPAIR TENDON BICEPS (Right: Arm Upper)    Relevant Problems   CARDIO   (+) Mixed hyperlipidemia   (+) Primary hypertension      MUSCULOSKELETAL   (+) Chronic bilateral low back pain without sciatica      NEURO/PSYCH   (+) Chronic bilateral low back pain without sciatica      PULMONARY   (+) SUGAR (obstructive sleep apnea)        Physical Exam    Airway     Mallampati score: III    Neck ROM: full  Mouth opening: >= 4 cm      Cardiovascular  Cardiovascular exam normal    Dental   No notable dental hx     Pulmonary  Pulmonary exam normal     Neurological  - normal exam    Other Findings        Anesthesia Plan  ASA Score- 3     Anesthesia Type- general with ASA Monitors.         Additional Monitors:     Airway Plan: Oral ETT.           Plan Factors-Exercise tolerance (METS): >4 METS.        Patient summary reviewed.    Patient is not a current smoker.              Induction- intravenous.    Postoperative Plan- .   Monitoring Plan - Monitoring plan - standard ASA monitoring  Post Operative Pain Plan - multimodal analgesia    Perioperative Resuscitation Plan - Level 1 - Full Code.       Informed Consent- Anesthetic plan and risks discussed with patient and spouse.        NPO Status:  Vitals Value Taken Time   Date of last liquid 05/21/25 05/22/25 09:38   Time of last liquid 2230 05/22/25 09:38   Date of last solid 05/21/25 05/22/25 09:38   Time of last solid 1930 05/22/25 09:38

## 2025-05-22 NOTE — ANESTHESIA POSTPROCEDURE EVALUATION
Post-Op Assessment Note    CV Status:  Stable  Pain Score: 0    Pain management: adequate       Mental Status:  Arousable and awake   Hydration Status:  Stable   PONV Controlled:  None   Airway Patency:  Patent  Airway: intubated     Post Op Vitals Reviewed: Yes    No anethesia notable event occurred.    Staff: CRNA           Last Filed PACU Vitals:  Vitals Value Taken Time   Temp     Pulse 112 05/22/25 14:53   BP     Resp 24 05/22/25 14:53   SpO2 92 % 05/22/25 14:53   Vitals shown include unfiled device data.

## 2025-05-23 NOTE — ANESTHESIA POSTPROCEDURE EVALUATION
Post-Op Assessment Note    CV Status:  Stable    Pain management: adequate       Mental Status:  Alert and awake   Hydration Status:  Euvolemic   PONV Controlled:  Controlled   Airway Patency:  Patent     Post Op Vitals Reviewed: Yes    No anethesia notable event occurred.    Staff: Anesthesiologist, CRNA           Last Filed PACU Vitals:  Vitals Value Taken Time   Temp     Pulse 104 05/22/25 16:02   /81 05/22/25 15:47   Resp 30 05/22/25 16:01   SpO2 92 % 05/22/25 16:00   Vitals shown include unfiled device data.    Modified Barbara:     Vitals Value Taken Time   Activity 2 05/22/25 15:32   Respiration 2 05/22/25 15:32   Circulation 2 05/22/25 15:32   Consciousness 2 05/22/25 15:32   Oxygen Saturation 2 05/22/25 15:32     Modified Barbara Score: 10

## 2025-05-29 ENCOUNTER — TELEPHONE (OUTPATIENT)
Dept: OBGYN CLINIC | Facility: HOSPITAL | Age: 30
End: 2025-05-29

## 2025-05-29 NOTE — TELEPHONE ENCOUNTER
Called and spoke with pt and relayed JERRICA Orona's message, he stated understanding, no further questions.

## 2025-05-29 NOTE — TELEPHONE ENCOUNTER
Caller: Patient    Doctor: Dr. Barriga    Reason for call: Patient had surgery on 5/22 and wants to know If he can drive.    Call back#: [phone]

## 2025-06-03 ENCOUNTER — OFFICE VISIT (OUTPATIENT)
Dept: OBGYN CLINIC | Facility: CLINIC | Age: 30
End: 2025-06-03

## 2025-06-03 VITALS — BODY MASS INDEX: 45.1 KG/M2 | WEIGHT: 315 LBS | HEIGHT: 70 IN

## 2025-06-03 DIAGNOSIS — Z98.890 STATUS POST TENDON REPAIR: Primary | ICD-10-CM

## 2025-06-03 PROCEDURE — 99024 POSTOP FOLLOW-UP VISIT: CPT | Performed by: ORTHOPAEDIC SURGERY

## 2025-06-03 NOTE — PROGRESS NOTES
Sports Medicine and Shoulder Surgery    Sourav King, 29 y.o. male   MRN# [id number]   : 1995        Assessment & Plan  Status post tendon repair    Orders:    Brace    Ambulatory Referral to Physical Therapy; Future         Patient is making expected progress from the day of surgery  Educated on expected postoperative outcomes/expectations  Encouraged to work with physical therapy on rehabilitation protocol.  A new physical therapy prescription was placed during the visit  Recommend rest, ice, compression, and elevation to help with pain and swelling  Patient was fitted for elbow brace today and placed in this  Follow up: 3 month  If any issues, questions, or concerns arise between now and the next appointment, we have encouraged the patient contact our team.      Scribe Attestation      I,:  Patricio Asher PA-C am acting as a scribe while in the presence of the attending physician.:       I,:  Dioni Barriga MD personally performed the services described in this documentation    as scribed in my presence.:               Chief Complaint:    Sourav King is a 29 y.o. male who presents for 2 week post op follow-up after Distal biceps repair - Right on 2025     Subjective:   Patient reports that they are recovering as expected from their procedure.  Patient denies any pain currently.  He reports some numbness and tingling around the thumb.  He is ready to come out of the splint.  He otherwise denies any other orthopedic concerns at this time.    Physical Examination:  Musculoskeletal: Right Elbow     Skin Intact.  Surgical incision well-healed   TTP None              Instability Negative              ROM : Not tested due to recent surgery  Ligamentously Stable   Compartments Soft/Compressible.   Sensation and motor function intact through radial/ulnar/median nerve distributions with the exception of some decreased sensation around the thumb.               Radial pulse palpable      Imaging  Studies:  N/a    Review of Systems:  General- denies fever/chills  HEENT- denies hearing loss or sore throat  Eyes- denies eye pain or visual disturbances, denies red eyes  Respiratory- denies cough or SOB  Cardio- denies chest pain or palpitations  GI- denies abdominal pain  Endocrine- denies urinary frequency  Urinary- denies pain with urination  Musculoskeletal- Negative except noted above  Skin- denies rashes or wounds  Neurological- denies dizziness or headache  Psychiatric- denies anxiety or difficulty concentrating       Allergies:  Allergies   Allergen Reactions    [place] [Fish Oil - Food Allergy] Anaphylaxis    Nuts - Food Allergy Throat Swelling     PISTACHIOS and PEANUTS only    Other      Mold and pollen    Latex Rash       Medications:  Current Medications[1]    Past Medical History:  Past Medical History[2]     Past Surgical History:  Past Surgical History[3]     Family History:  Family History[4]     Social History:  Social History     Socioeconomic History    Marital status: /Civil Union     Spouse name: Not on file    Number of children: Not on file    Years of education: Not on file    Highest education level: Not on file   Occupational History    Not on file   Tobacco Use    Smoking status: Some Days     Types: Cigars     Passive exposure: Never (has 1 cigar every 2 weeks)    Smokeless tobacco: Former     Quit date: 6/20/2015    Tobacco comments:     Cigars once a week   Vaping Use    Vaping status: Never Used   Substance and Sexual Activity    Alcohol use: Yes     Alcohol/week: 1.0 standard drink of alcohol     Types: 1 Standard drinks or equivalent per week     Comment: socially once a week    Drug use: No    Sexual activity: Not on file   Other Topics Concern    Not on file   Social History Narrative    Not on file     Social Drivers of Health     Financial Resource Strain: Not on file   Food Insecurity: Not on file   Transportation Needs: Not on file   Physical Activity: Not on file    Stress: Not on file   Social Connections: Not on file   Intimate Partner Violence: Not At Risk (12/29/2022)    Humiliation, Afraid, Rape, and Kick questionnaire     Fear of Current or Ex-Partner: No     Emotionally Abused: No     Physically Abused: No     Sexually Abused: No   Housing Stability: Not on file        Objective:   Body mass index is 53.52 kg/m².      ---------------------------------------------------------------------  Dioni Barriga MD, PhD   Orthopedic Surgery, [facility]   Sports Medicine and Shoulder Surgery                   [1]   Current Outpatient Medications:     lisinopril (ZESTRIL) 40 mg tablet, Take 1 tablet by mouth once daily, Disp: 30 tablet, Rfl: 5    acetaminophen (TYLENOL) 325 mg tablet, Take 1 tablet (325 mg total) by mouth every 6 (six) hours as needed for mild pain (Patient not taking: Reported on 6/3/2025), Disp: 60 tablet, Rfl: 0    benzonatate (TESSALON) 200 MG capsule, Take 1 capsule (200 mg total) by mouth 3 (three) times a day as needed for cough (Patient not taking: Reported on 2/4/2025), Disp: 20 capsule, Rfl: 0    carbamide peroxide (DEBROX) 6.5 % otic solution, Administer 5 drops to the right ear 2 (two) times a day (Patient not taking: Reported on 5/30/2023), Disp: 15 mL, Rfl: 0    guaiFENesin (ROBITUSSIN) 100 MG/5ML oral liquid, Take 200 mg by mouth 3 (three) times a day as needed for cough (Patient not taking: Reported on 2/4/2025), Disp: , Rfl:     naproxen (Naprosyn) 500 mg tablet, Take 1 tablet (500 mg total) by mouth 2 (two) times a day with meals (Patient not taking: Reported on 6/3/2025), Disp: 42 tablet, Rfl: 0    oxyCODONE (Roxicodone) 5 immediate release tablet, Take 1 tablet (5 mg total) by mouth every 4 (four) hours as needed for moderate pain for up to 20 doses Max Daily Amount: 30 mg (Patient not taking: Reported on 6/3/2025), Disp: 20 tablet, Rfl: 0  [2]   Past Medical History:  Diagnosis Date    CPAP (continuous positive  airway pressure) dependence     Hypertension     Malignant hyperthemia     Family history of malignant hyperthermia, pt never tested for it    Sleep apnea    [3]   Past Surgical History:  Procedure Laterality Date    EAR SURGERY      FRACTURE SURGERY Right     Rt foot Fx 5th metatarsal surgery 05/2017, with internal pin    OK SURGICAL ARTHROSCOPY SHOULDER W/ROTATOR CUFF RPR Right 5/22/2025    Procedure: Distal biceps repair;  Surgeon: Dioni Barriga MD;  Location: [facility];  Service: Orthopedics    TONSILLECTOMY      VASECTOMY     [4] No family history on file.

## 2025-06-11 ENCOUNTER — EVALUATION (OUTPATIENT)
Dept: PHYSICAL THERAPY | Facility: CLINIC | Age: 30
End: 2025-06-11
Payer: COMMERCIAL

## 2025-06-11 ENCOUNTER — TELEPHONE (OUTPATIENT)
Age: 30
End: 2025-06-11

## 2025-06-11 DIAGNOSIS — M25.521 RIGHT ELBOW PAIN: Primary | ICD-10-CM

## 2025-06-11 DIAGNOSIS — S46.211D RUPTURE OF RIGHT DISTAL BICEPS TENDON, SUBSEQUENT ENCOUNTER: ICD-10-CM

## 2025-06-11 PROCEDURE — 97110 THERAPEUTIC EXERCISES: CPT | Performed by: PHYSICAL THERAPIST

## 2025-06-11 PROCEDURE — 97161 PT EVAL LOW COMPLEX 20 MIN: CPT | Performed by: PHYSICAL THERAPIST

## 2025-06-11 NOTE — TELEPHONE ENCOUNTER
Called and spoke w/pt and he would like to use Desitin Cream on areas and cover w/gauze.  Is this ok? He uses this for other chaffed areas. Please advise.

## 2025-06-11 NOTE — TELEPHONE ENCOUNTER
Caller: Patient     Doctor: Dr. Barriga     Reason for call: patient had rt distal biceps tendon repair on 5/22/25 and stated the brace is starting to cause chafing about 1 inch from his incision. He went for PT and the therapist was concerned and told him to call the office.     Call back#: [phone]

## 2025-06-11 NOTE — TELEPHONE ENCOUNTER
Please see [employer] msg and pictures.  Is there a brace fitter available to adjust pt's brace so appt can be made? Thank you.

## 2025-06-11 NOTE — PROGRESS NOTES
PT Evaluation     Today's date: 2025  Patient name: Sourav King  : 1995  MRN: [id number]  Referring provider: Patricio Asher PA*  Dx:   Encounter Diagnosis     ICD-10-CM    1. Right elbow pain  M25.521       2. Rupture of right distal biceps tendon, subsequent encounter  S46.211D                      Assessment  Impairments: abnormal or restricted ROM, activity intolerance, impaired physical strength, lacks appropriate home exercise program, pain with function, scapular dyskinesis, weight-bearing intolerance and poor posture     Assessment details: Sourav King is a 29 y.o. male presenting to physical therapy s/p distal biceps tendon repair of right arm performed by Dr. Barriga on 25. Patient presents with pain, decreased strength, decreased range of motion and decreased tolerance to activity. Due to these impairments patient has difficulty performing activities of daily living, recreational activities and engaging in social activities. Patient would benefit from skilled physical therapy services to address these impairments and to maximize function. Thank you for the referral.   Understanding of Dx/Px/POC: excellent    Goals  Impairment Goals:  1.) Pt will have decreased sx at rest by 50% in 2-4 weeks.  2.) Pt will have improved shoulder range of motion by degrees in 2-4 weeks.  3.) Pt will have improved shoulder strength by MMT in 4-6 weeks.  4.) Pt will have decreased tenderness to palpation by 50% in 3-4 weeks.    Functional Goals:  1.) Pt will be independent in their home exercise program in 1 week.  2.) Pt will be able to complete overhead lifting motions without pain in 4-6 weeks.  3.) Pt will be able to complete all ADLs without pain in 6-8 weeks.  4.) Pt will have an improved FOTO score of 75/100 in 6-8 weeks.  5.) Pt will be able to return to work with minimal restrictions in 6-8 weeks.      Plan  Patient would benefit from: skilled PT    Planned therapy interventions: joint  mobilization, body mechanics training, balance, patient education, therapeutic exercise, home exercise program, neuromuscular re-education, strengthening, graded activity, graded exercise, therapeutic activities and stretching    Frequency: 1x week  Duration in weeks: 8  Plan of Care beginning date: 2025  Plan of Care expiration date: 2025  Treatment plan discussed with: patient      Subjective Evaluation    History of Present Illness  Mechanism of injury: Sourav King is a 29 y.o. male presenting to physical therapy s/p distal biceps tendon repair of right arm performed by Dr. Barriga on 25. He explains that he was lifting a water logged tree off the road on May 9th when he felt his biceps give, immediately went to the ER and had surgery a few weeks later.     He is having very minimal pain and has not been icing or taking pain medication. He is complaining about some rubbing in the elbow area due to the brace.     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 He   Patient Goals  Patient goals for therapy: decreased pain, increased motion, increased strength and return to sport/leisure activities    Pain  Current pain ratin  At best pain ratin  At worst pain rating: 3  Quality: dull ache, sharp and tight  Relieving factors: ice and change in position  Aggravating factors: lifting and overhead activity          Objective     Observations     Right Elbow   Positive for abrasion and incision. Negative for drainage.     Additional Observation Details  Slight irritation where his brace is hitting/rubbing just distal to the right elbow, lateral to his incision    Incision appears to be healing appropriately and without compensation    Cervical/Thoracic Screen   Cervical range of motion within normal limits    Active Range of Motion     Right Wrist   Wrist flexion: 45 degrees   Wrist extension: 70 degrees   Radial deviation: 20 degrees   Ulnar deviation: 20 degrees     Passive  Range of Motion     Right Elbow   Flexion: 130 degrees   Extension: -10 degrees     General Comments:    Upper quarter screen   Shoulder: unremarkable             Precautions: s/p distal biceps tendon repair Date of Surgery: 5/22/25    2-6 Weeks:   Begin Active Assist ROM. Initially Limit Extension to 45°, Advance to   full extension by 6 weeks.   Splint all times other than exercises   Wrist/Shoulder ROM exercises    6-9 Weeks: May D/C elbow Splint   Continue Passive/Active Assist ROM Elbow   Begin Biceps Isometrics   Begin Cuff/Deltoid Isometrics   Maintain Wrist/Shoulder ROM    9-12 Weeks: Begin Active Elbow Flexion against gravity   Maintain ROM Elbow/Wrist/Shoulder   Advance to resistive Strengthening Deltoid/Rotator Cuff         Manuals 6/11            PROM                                                    Neuro Re-Ed                                                                                                        Ther Ex             Shoulder blade squeezes 10x5''            Passive wrist ext s' 10x5''            Passive wrist flex s' 10x5''            Gripping 2x10 G            SL ER                                                    Ther Activity                                       Gait Training                                       Modalities

## 2025-06-11 NOTE — TELEPHONE ENCOUNTER
Called and spoke w/pt and he had on 5/22/25 Distal biceps repair (Right: Shoulder).  States Braces is rubbing on areas of his skin and causing red areas that there is concerns for breakdown. One area is about an inch above brace that is approx 2in X 1inch and is covered but still having irritation from brace. Second area is on forearm from when he bends his arm and when brace hits it or slides down-it is smaller but just as red. He will send pictures via [employer] when he can as he is currently as a gas station. He wears brace all the time except for showering and PT exercises. Await [facility] pictures.

## 2025-06-18 ENCOUNTER — TELEPHONE (OUTPATIENT)
Age: 30
End: 2025-06-18

## 2025-06-18 ENCOUNTER — OFFICE VISIT (OUTPATIENT)
Dept: PHYSICAL THERAPY | Facility: CLINIC | Age: 30
End: 2025-06-18
Payer: COMMERCIAL

## 2025-06-18 DIAGNOSIS — M25.521 RIGHT ELBOW PAIN: Primary | ICD-10-CM

## 2025-06-18 DIAGNOSIS — S46.211D RUPTURE OF RIGHT DISTAL BICEPS TENDON, SUBSEQUENT ENCOUNTER: ICD-10-CM

## 2025-06-18 PROCEDURE — 97535 SELF CARE MNGMENT TRAINING: CPT | Performed by: PHYSICAL THERAPIST

## 2025-06-18 PROCEDURE — 97110 THERAPEUTIC EXERCISES: CPT | Performed by: PHYSICAL THERAPIST

## 2025-06-18 NOTE — TELEPHONE ENCOUNTER
11-Jun-2025 18:00 Patient was removed from the psychological testing wait list due to no longer being established with a  provider.

## 2025-06-18 NOTE — PROGRESS NOTES
Daily Note     Today's date: 2025  Patient name: Sourav King  : 1995  MRN: [id number]  Referring provider: Patricio Asher PA*  Dx:   Encounter Diagnosis     ICD-10-CM    1. Right elbow pain  M25.521       2. Rupture of right distal biceps tendon, subsequent encounter  S46.211D                      Subjective: Sourav explains that his motion has improved greatly. He talked to his surgeon about the brace rubbing and they mentioned getting it re-fit. He arrives without the brace today and without pain.       Objective: See treatment diary below      Assessment: Tolerated treatment well. Patient demonstrated fatigue post treatment and would benefit from continued PT. Spent significant time reviewing his protocol this morning and emphasizing the importance of healing time before using the biceps. He demonstrated understanding. Scheduled his next follow up for when he would hit the next phase of rehab and we can begin isometric biceps loading.       Plan: Continue per plan of care.      Precautions: s/p distal biceps tendon repair Date of Surgery: 25    2-6 Weeks:   Begin Active Assist ROM. Initially Limit Extension to 45°, Advance to   full extension by 6 weeks.   Splint all times other than exercises   Wrist/Shoulder ROM exercises    6-9 Weeks: May D/C elbow Splint   Continue Passive/Active Assist ROM Elbow   Begin Biceps Isometrics   Begin Cuff/Deltoid Isometrics   Maintain Wrist/Shoulder ROM    9-12 Weeks: Begin Active Elbow Flexion against gravity   Maintain ROM Elbow/Wrist/Shoulder   Advance to resistive Strengthening Deltoid/Rotator Cuff         Manuals            PROM                                                    Neuro Re-Ed                                                                                                        Ther Ex             Shoulder blade squeezes 10x5'' 10x5''           Passive wrist ext s' 10x5'' 10x5''           Passive wrist flex s' 10x5'' 10x5''            Gripping 2x10 G 2x10 G           SL ER  2x10           SL shoulder flexion w/ elbow bent  2x10x5''           Prone row  2x10           Passive sup/pronation s'  10x5''           Ther Activity                                       Gait Training                                       Modalities

## 2025-06-19 ENCOUNTER — TELEPHONE (OUTPATIENT)
Dept: OBGYN CLINIC | Facility: CLINIC | Age: 30
End: 2025-06-19

## 2025-06-25 ENCOUNTER — APPOINTMENT (OUTPATIENT)
Dept: PHYSICAL THERAPY | Facility: CLINIC | Age: 30
End: 2025-06-25
Payer: COMMERCIAL

## 2025-07-02 ENCOUNTER — OFFICE VISIT (OUTPATIENT)
Dept: PHYSICAL THERAPY | Facility: CLINIC | Age: 30
End: 2025-07-02
Payer: COMMERCIAL

## 2025-07-02 DIAGNOSIS — S46.211D RUPTURE OF RIGHT DISTAL BICEPS TENDON, SUBSEQUENT ENCOUNTER: ICD-10-CM

## 2025-07-02 DIAGNOSIS — M25.521 RIGHT ELBOW PAIN: Primary | ICD-10-CM

## 2025-07-02 PROCEDURE — 97110 THERAPEUTIC EXERCISES: CPT | Performed by: PHYSICAL THERAPIST

## 2025-07-02 NOTE — PROGRESS NOTES
Daily Note     Today's date: 2025  Patient name: Sourav King  : 1995  MRN: [id number]  Referring provider: Patricio Asher PA*  Dx:   Encounter Diagnosis     ICD-10-CM    1. Right elbow pain  M25.521       2. Rupture of right distal biceps tendon, subsequent encounter  S46.211D                      Subjective: Sourav explains that his biceps has been feeling well, admitting that he has been doing much more activity outside of his normal PT exercises. He has been out of the brace for a few weeks, lifting light things with the right arm. He has avoided shooting to this point.       Objective: See treatment diary below      Assessment: Tolerated treatment well. Patient demonstrated fatigue post treatment and exhibited good technique with therapeutic exercises. Introduced biceps and rotator cuff isometrics per phase 2 of post-operative protocol. His elbow, forearm, wrist and shoulder range of motion remain full and his incision appears to be healing appropriately. Fatigue only upon completion of isometrics and encouraged him to keep them in a pain free intensity, only increasing as tolerance allows. Continue to progress as appropriate.       Plan: Continue per plan of care.      Precautions: s/p distal biceps tendon repair Date of Surgery: 25    2-6 Weeks:   Begin Active Assist ROM. Initially Limit Extension to 45°, Advance to   full extension by 6 weeks.   Splint all times other than exercises   Wrist/Shoulder ROM exercises    6-9 Weeks: May D/C elbow Splint   Continue Passive/Active Assist ROM Elbow   Begin Biceps Isometrics   Begin Cuff/Deltoid Isometrics   Maintain Wrist/Shoulder ROM    9-12 Weeks: Begin Active Elbow Flexion against gravity   Maintain ROM Elbow/Wrist/Shoulder   Advance to resistive Strengthening Deltoid/Rotator Cuff         Manuals           PROM                                                    Neuro Re-Ed                                                                                                         Ther Ex             Shoulder blade squeezes 10x5'' 10x5''           Passive wrist ext s' 10x5'' 10x5'' 10x5''          Passive wrist flex s' 10x5'' 10x5'' 10x5''          Gripping 2x10 G 2x10 G 2x10 G          SL ER  2x10 2x10          SL shoulder flexion w/ elbow bent  2x10x5'' 2x10x5''          Prone row  2x10 2x10          Passive sup/pronation s'  10x5'' 10x5''          Biceps isometrics   10x10''          4 way shoulder isometrics   10x10'' ea                       Ther Activity                                       Gait Training                                       Modalities

## 2025-07-08 VITALS — WEIGHT: 315 LBS | HEIGHT: 70 IN | BODY MASS INDEX: 45.1 KG/M2

## 2025-07-08 DIAGNOSIS — Z98.890 STATUS POST TENDON REPAIR: Primary | ICD-10-CM

## 2025-07-08 PROCEDURE — 99024 POSTOP FOLLOW-UP VISIT: CPT | Performed by: ORTHOPAEDIC SURGERY

## 2025-07-08 NOTE — LETTER
July 8, 2025     Patient: Sourav King  YOB: 1995  Date of Visit: 7/8/2025      To Whom it May Concern:    Sourav King is under my professional care. Sourav was seen in my office on 7/8/2025. Sourav should be on light duty. He should avoid lifting heavier than 10 pounds. These restrictions should last for 6 weeks.    If you have any questions or concerns, please don't hesitate to call.         Sincerely,          Dioni Barriga MD        CC: No Recipients

## 2025-07-08 NOTE — PROGRESS NOTES
Sports Medicine and Shoulder Surgery    Sourav King, 29 y.o. male   MRN# [id number]   : 1995        Assessment & Plan  Status post tendon repair    Orders:  •  Ambulatory Referral to Physical Therapy; Future         Patient is making faster than expected progress from the day of surgery  Educated on expected postoperative outcomes/expectations  Encouraged to work with physical therapy on rehabilitation protocol.  A new physical therapy prescription was placed during the visit  Recommend rest, ice, compression, and elevation to help with pain and swelling  Advised to continue to avoid lifting heavier than 5-10 pounds for an additional 6 weeks before slowly returning to activity as tolerated  Follow up: as scheduled on   If any issues, questions, or concerns arise between now and the next appointment, we have encouraged the patient contact our team.      Scribe Attestation      I,:  Patricio Asehr PA-C am acting as a scribe while in the presence of the attending physician.:       I,:  Dioni Barriga MD personally performed the services described in this documentation    as scribed in my presence.:               Chief Complaint:    Sourav King is a 29 y.o. male who presents for 6 week post op follow-up after Distal biceps repair - Right on 2025     Subjective:   Patient reports that they are recovering as expected from their procedure. He reports a little numbness around his thumb but otherwise denies numbness.  Patient has been working with physical therapy which is going well. Patient denies any new orthopedic complaints at this time.      Physical Examination:  Musculoskeletal: Right Elbow                 Skin Intact.  Surgical incision well-healed               TTP None              Instability Negative              ROM : Not tested due to recent surgery  Ligamentously Stable               Compartments Soft/Compressible.               Sensation and motor function intact through  radial/ulnar/median nerve distributions with the exception of some decreased sensation around the thumb.               Radial pulse palpable     Imaging Studies:  N/a    Review of Systems:  General- denies fever/chills  HEENT- denies hearing loss or sore throat  Eyes- denies eye pain or visual disturbances, denies red eyes  Respiratory- denies cough or SOB  Cardio- denies chest pain or palpitations  GI- denies abdominal pain  Endocrine- denies urinary frequency  Urinary- denies pain with urination  Musculoskeletal- Negative except noted above  Skin- denies rashes or wounds  Neurological- denies dizziness or headache  Psychiatric- denies anxiety or difficulty concentrating       Allergies:  Allergies   Allergen Reactions   • [place] [Fish Oil - Food Allergy] Anaphylaxis   • Nuts - Food Allergy Throat Swelling     PISTACHIOS and PEANUTS only   • Other      Mold and pollen   • Latex Rash       Medications:  Current Medications[1]    Past Medical History:  Past Medical History[2]     Past Surgical History:  Past Surgical History[3]     Family History:  Family History[4]     Social History:  Social History     Socioeconomic History   • Marital status: /Civil Union     Spouse name: Not on file   • Number of children: Not on file   • Years of education: Not on file   • Highest education level: Not on file   Occupational History   • Not on file   Tobacco Use   • Smoking status: Some Days     Types: Cigars     Passive exposure: Never (has 1 cigar every 2 weeks)   • Smokeless tobacco: Former     Quit date: 6/20/2015   • Tobacco comments:     Cigars once a week   Vaping Use   • Vaping status: Never Used   Substance and Sexual Activity   • Alcohol use: Yes     Alcohol/week: 1.0 standard drink of alcohol     Types: 1 Standard drinks or equivalent per week     Comment: socially once a week   • Drug use: No   • Sexual activity: Not on file   Other Topics Concern   • Not on file   Social History Narrative   • Not on file      Social Drivers of Health     Financial Resource Strain: Not on file   Food Insecurity: Not on file   Transportation Needs: Not on file   Physical Activity: Not on file   Stress: Not on file   Social Connections: Not on file   Intimate Partner Violence: Not At Risk (12/29/2022)    Humiliation, Afraid, Rape, and Kick questionnaire    • Fear of Current or Ex-Partner: No    • Emotionally Abused: No    • Physically Abused: No    • Sexually Abused: No   Housing Stability: Not on file        Objective:   Body mass index is 53.52 kg/m².      ---------------------------------------------------------------------  Dioni Barriga MD, PhD   Orthopedic Surgery, [facility]   Sports Medicine and Shoulder Surgery                     [1]    Current Outpatient Medications:   •  lisinopril (ZESTRIL) 40 mg tablet, Take 1 tablet by mouth once daily, Disp: 30 tablet, Rfl: 5  •  acetaminophen (TYLENOL) 325 mg tablet, Take 1 tablet (325 mg total) by mouth every 6 (six) hours as needed for mild pain (Patient not taking: Reported on 7/8/2025), Disp: 60 tablet, Rfl: 0  •  benzonatate (TESSALON) 200 MG capsule, Take 1 capsule (200 mg total) by mouth 3 (three) times a day as needed for cough (Patient not taking: Reported on 2/4/2025), Disp: 20 capsule, Rfl: 0  •  carbamide peroxide (DEBROX) 6.5 % otic solution, Administer 5 drops to the right ear 2 (two) times a day (Patient not taking: Reported on 5/30/2023), Disp: 15 mL, Rfl: 0  •  guaiFENesin (ROBITUSSIN) 100 MG/5ML oral liquid, Take 200 mg by mouth 3 (three) times a day as needed for cough (Patient not taking: Reported on 2/4/2025), Disp: , Rfl:   •  naproxen (Naprosyn) 500 mg tablet, Take 1 tablet (500 mg total) by mouth 2 (two) times a day with meals (Patient not taking: Reported on 7/8/2025), Disp: 42 tablet, Rfl: 0  •  oxyCODONE (Roxicodone) 5 immediate release tablet, Take 1 tablet (5 mg total) by mouth every 4 (four) hours as needed for moderate pain for  up to 20 doses Max Daily Amount: 30 mg (Patient not taking: Reported on 7/8/2025), Disp: 20 tablet, Rfl: 0  [2]  Past Medical History:  Diagnosis Date   • CPAP (continuous positive airway pressure) dependence    • Hypertension    • Malignant hyperthemia     Family history of malignant hyperthermia, pt never tested for it   • Sleep apnea    [3]  Past Surgical History:  Procedure Laterality Date   • EAR SURGERY     • FRACTURE SURGERY Right     Rt foot Fx 5th metatarsal surgery 05/2017, with internal pin   • DE SURGICAL ARTHROSCOPY SHOULDER W/ROTATOR CUFF RPR Right 5/22/2025    Procedure: Distal biceps repair;  Surgeon: Dioni Barriga MD;  Location: [facility];  Service: Orthopedics   • TONSILLECTOMY     • VASECTOMY     [4]  No family history on file.

## 2025-07-10 ENCOUNTER — TELEPHONE (OUTPATIENT)
Age: 30
End: 2025-07-10

## 2025-07-10 NOTE — TELEPHONE ENCOUNTER
Caller: Patient    Doctor: Dr. Barriga    Reason for call: Santiago requests specific duration of limitations. Patient requests fax form back to [id number] #:[phone]. Patient uploaded in form in [employer].    Call back#: [phone]

## 2025-07-11 ENCOUNTER — TELEPHONE (OUTPATIENT)
Age: 30
End: 2025-07-11

## 2025-07-11 NOTE — TELEPHONE ENCOUNTER
Caller: Patient    Doctor: Dr. Barriga    Reason for call:     Called back about the paperwork, advised it was sent this am...    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  (Hand gun 45 caliber, or 223 [address]) He had right bicept surgery.    Call back#: [phone], may leave a message on this phone at anytime.

## 2025-07-16 ENCOUNTER — OFFICE VISIT (OUTPATIENT)
Dept: PHYSICAL THERAPY | Facility: CLINIC | Age: 30
End: 2025-07-16
Payer: COMMERCIAL

## 2025-07-16 DIAGNOSIS — S46.211D RUPTURE OF RIGHT DISTAL BICEPS TENDON, SUBSEQUENT ENCOUNTER: ICD-10-CM

## 2025-07-16 DIAGNOSIS — M25.521 RIGHT ELBOW PAIN: Primary | ICD-10-CM

## 2025-07-16 PROCEDURE — 97110 THERAPEUTIC EXERCISES: CPT | Performed by: PHYSICAL THERAPIST

## 2025-07-16 NOTE — PROGRESS NOTES
Daily Note     Today's date: 2025  Patient name: Sourav King  : 1995  MRN: [id number]  Referring provider: Patricio Asher PA*  Dx:   Encounter Diagnosis     ICD-10-CM    1. Right elbow pain  M25.521       2. Rupture of right distal biceps tendon, subsequent encounter  S46.211D                      Subjective: Sourav explains that his elbow has been feeling well, his ortho appointment also went well. His leave extended until , to return to [employer] night shift at that point.       Objective: See treatment diary below      Assessment: Tolerated treatment well. Patient demonstrated fatigue post treatment and exhibited good technique with therapeutic exercises. Added in additional strengthening including triceps extension. Fatigue only upon completion. Begin next phase of rehab at week 9.    Plan: Continue per plan of care.      Precautions: s/p distal biceps tendon repair Date of Surgery: 25    2-6 Weeks:   Begin Active Assist ROM. Initially Limit Extension to 45°, Advance to   full extension by 6 weeks.   Splint all times other than exercises   Wrist/Shoulder ROM exercises    6-9 Weeks: May D/C elbow Splint   Continue Passive/Active Assist ROM Elbow   Begin Biceps Isometrics   Begin Cuff/Deltoid Isometrics   Maintain Wrist/Shoulder ROM    9-12 Weeks: Begin Active Elbow Flexion against gravity   Maintain ROM Elbow/Wrist/Shoulder   Advance to resistive Strengthening Deltoid/Rotator Cuff         Manuals          PROM                                                    Neuro Re-Ed                                                                                                        Ther Ex             Shoulder blade squeezes 10x5'' 10x5''           Passive wrist ext s' 10x5'' 10x5'' 10x5'' 10x5''         Passive wrist flex s' 10x5'' 10x5'' 10x5'' 10x5''         Gripping 2x10 G 2x10 G 2x10 G          SL ER  2x10 2x10          SL shoulder flexion w/ elbow bent  2x10x5'' 2x10x5''  2x10x5''         Prone row  2x10 2x10          Passive sup/pronation s'  10x5'' 10x5''          Biceps isometrics   10x10'' 10x10''         4 way shoulder isometrics   10x10'' ea          Tricep ext iso             Wall push ups    2x10, 2 feet from door         Sup/Pro s'     10x5'' tigertail w/ 1# anlke wt         Ther Activity                                       Gait Training                                       Modalities

## 2025-07-29 ENCOUNTER — OFFICE VISIT (OUTPATIENT)
Dept: PHYSICAL THERAPY | Facility: CLINIC | Age: 30
End: 2025-07-29
Payer: COMMERCIAL

## 2025-07-29 DIAGNOSIS — M25.521 RIGHT ELBOW PAIN: Primary | ICD-10-CM

## 2025-07-29 DIAGNOSIS — S46.211D RUPTURE OF RIGHT DISTAL BICEPS TENDON, SUBSEQUENT ENCOUNTER: ICD-10-CM

## 2025-07-29 PROCEDURE — 97110 THERAPEUTIC EXERCISES: CPT | Performed by: PHYSICAL THERAPIST

## 2025-08-04 ENCOUNTER — OFFICE VISIT (OUTPATIENT)
Dept: FAMILY MEDICINE CLINIC | Facility: CLINIC | Age: 30
End: 2025-08-04
Payer: COMMERCIAL

## 2025-08-04 VITALS
DIASTOLIC BLOOD PRESSURE: 80 MMHG | HEART RATE: 98 BPM | HEIGHT: 70 IN | OXYGEN SATURATION: 97 % | TEMPERATURE: 99 F | RESPIRATION RATE: 18 BRPM | WEIGHT: 315 LBS | SYSTOLIC BLOOD PRESSURE: 160 MMHG | BODY MASS INDEX: 45.1 KG/M2

## 2025-08-04 DIAGNOSIS — L01.00 IMPETIGO: Primary | ICD-10-CM

## 2025-08-04 DIAGNOSIS — I10 PRIMARY HYPERTENSION: ICD-10-CM

## 2025-08-04 PROCEDURE — 99214 OFFICE O/P EST MOD 30 MIN: CPT | Performed by: STUDENT IN AN ORGANIZED HEALTH CARE EDUCATION/TRAINING PROGRAM

## 2025-08-04 RX ORDER — MUPIROCIN 2 %
OINTMENT (GRAM) TOPICAL 3 TIMES DAILY
Qty: 30 G | Refills: 0 | Status: SHIPPED | OUTPATIENT
Start: 2025-08-04 | End: 2025-08-09

## 2025-08-05 ENCOUNTER — OFFICE VISIT (OUTPATIENT)
Dept: SLEEP CENTER | Facility: CLINIC | Age: 30
End: 2025-08-05
Payer: COMMERCIAL

## 2025-08-05 VITALS
DIASTOLIC BLOOD PRESSURE: 84 MMHG | HEIGHT: 70 IN | BODY MASS INDEX: 45.1 KG/M2 | WEIGHT: 315 LBS | SYSTOLIC BLOOD PRESSURE: 130 MMHG

## 2025-08-05 DIAGNOSIS — I10 PRIMARY HYPERTENSION: ICD-10-CM

## 2025-08-05 DIAGNOSIS — G47.33 OSA (OBSTRUCTIVE SLEEP APNEA): Primary | ICD-10-CM

## 2025-08-05 PROCEDURE — 99213 OFFICE O/P EST LOW 20 MIN: CPT | Performed by: PHYSICIAN ASSISTANT

## 2025-08-06 ENCOUNTER — TELEPHONE (OUTPATIENT)
Dept: SLEEP CENTER | Facility: CLINIC | Age: 30
End: 2025-08-06

## 2025-08-06 LAB

## 2025-08-08 ENCOUNTER — OFFICE VISIT (OUTPATIENT)
Dept: PHYSICAL THERAPY | Facility: CLINIC | Age: 30
End: 2025-08-08
Payer: COMMERCIAL

## 2025-08-08 DIAGNOSIS — M25.521 RIGHT ELBOW PAIN: Primary | ICD-10-CM

## 2025-08-08 DIAGNOSIS — S46.211D RUPTURE OF RIGHT DISTAL BICEPS TENDON, SUBSEQUENT ENCOUNTER: ICD-10-CM

## 2025-08-08 PROCEDURE — 97110 THERAPEUTIC EXERCISES: CPT

## 2025-08-22 ENCOUNTER — OFFICE VISIT (OUTPATIENT)
Dept: PHYSICAL THERAPY | Facility: CLINIC | Age: 30
End: 2025-08-22
Payer: COMMERCIAL

## 2025-08-22 DIAGNOSIS — S46.211D RUPTURE OF RIGHT DISTAL BICEPS TENDON, SUBSEQUENT ENCOUNTER: ICD-10-CM

## 2025-08-22 DIAGNOSIS — M25.521 RIGHT ELBOW PAIN: Primary | ICD-10-CM

## 2025-08-22 PROCEDURE — 97110 THERAPEUTIC EXERCISES: CPT | Performed by: PHYSICAL THERAPIST

## (undated) DEVICE — CHLORAPREP HI-LITE 26ML ORANGE

## (undated) DEVICE — COBAN 4 IN STERILE

## (undated) DEVICE — CUFF TOURNIQUET 18 X 4 IN QUICK CONNECT DISP 1 BLADDER

## (undated) DEVICE — 10FR FRAZIER SUCTION HANDLE: Brand: CARDINAL HEALTH

## (undated) DEVICE — DRESSING MEPILEX AG BORDER POST-OP 4 X 8 IN

## (undated) DEVICE — BLADE SHAVER DISSECTOR 5MM 13CM COOLCUT

## (undated) DEVICE — LAPAROTOMY SPONGE - RF AND X-RAY DETECTABLE PRE-WASHED: Brand: SITUATE

## (undated) DEVICE — INTENT TO BE USED WITH SUTURE MATERIAL FOR TISSUE CLOSURE: Brand: RICHARD-ALLAN® NEEDLE 1/2 CIRCLE TAPER

## (undated) DEVICE — GLOVE INDICATOR PI UNDERGLOVE SZ 8 BLUE

## (undated) DEVICE — INTENDED FOR TISSUE SEPARATION, AND OTHER PROCEDURES THAT REQUIRE A SHARP SURGICAL BLADE TO PUNCTURE OR CUT.: Brand: BARD-PARKER ® CARBON RIB-BACK BLADES

## (undated) DEVICE — TUBING SUCTION 5MM X 12 FT

## (undated) DEVICE — PROBE ABLATION  APOLLO RF 90 DEG MULTI PORT

## (undated) DEVICE — ARM SLING: Brand: DEROYAL

## (undated) DEVICE — POSITIONER TRIMANO LIMB BEACH CHAIR

## (undated) DEVICE — KERLIX BANDAGE ROLL: Brand: KERLIX

## (undated) DEVICE — OCCLUSIVE GAUZE STRIP,3% BISMUTH TRIBROMOPHENATE IN PETROLATUM BLEND: Brand: XEROFORM

## (undated) DEVICE — EXOFIN PRECISION PEN HIGH VISCOSITY TOPICAL SKIN ADHESIVE: Brand: EXOFIN PRECISION PEN, 1G

## (undated) DEVICE — SKIN MARKER DUAL TIP WITH RULER CAP, FLEXIBLE RULER AND LABELS: Brand: DEVON

## (undated) DEVICE — 3000CC GUARDIAN II: Brand: GUARDIAN

## (undated) DEVICE — DRESSING MEPILEX AG BORDER POST-OP 4 X 6 IN

## (undated) DEVICE — CAST PADDING 4 IN SYNTHETIC NON-STRL

## (undated) DEVICE — ABDOMINAL PAD: Brand: DERMACEA

## (undated) DEVICE — COBAN 6 IN STERILE

## (undated) DEVICE — STERILE BETHLEHEM PLASTIC HAND: Brand: CARDINAL HEALTH

## (undated) DEVICE — SCD SEQUENTIAL COMPRESSION COMFORT SLEEVE MEDIUM KNEE LENGTH: Brand: KENDALL SCD

## (undated) DEVICE — 4-PORT MANIFOLD: Brand: NEPTUNE 2

## (undated) DEVICE — BETHLEHEM TOTAL HIP, KIT: Brand: CARDINAL HEALTH

## (undated) DEVICE — PENCILETTE PUSH BUTTON COATED

## (undated) DEVICE — U-DRAPE: Brand: CONVERTORS

## (undated) DEVICE — BULB SYRINGE, IRRIGATION WITH PROTECTIVE CAP, 60 CC, INDIVIDUALLY WRAPPED: Brand: DOVER

## (undated) DEVICE — MAT ABSORBANT ARTHROSCOPY FLOOR 46 X 40 IN

## (undated) DEVICE — PADDING CAST 4 IN  COTTON STRL

## (undated) DEVICE — SUT VICRYL 2-0 SH 27 IN UNDYED J417H

## (undated) DEVICE — 3M™ STERI-DRAPE™ U-DRAPE 1015: Brand: STERI-DRAPE™

## (undated) DEVICE — SUT ETHILON 3-0 PS-1 18 IN 1663G

## (undated) DEVICE — NEEDLE SPINAL18G X 3.5 IN QUINCKE

## (undated) DEVICE — SCORPION FAST PASS INST

## (undated) DEVICE — IMPERVIOUS STOCKINETTE: Brand: DEROYAL

## (undated) DEVICE — SUT MONOCRYL 3-0 PS-2 27 IN Y427H

## (undated) DEVICE — CANNULA PURPLE MITEK 7 MM X 75 MM

## (undated) DEVICE — DISPOSABLE OR TOWEL: Brand: CARDINAL HEALTH

## (undated) DEVICE — SURGICAL GOWN, XL SMARTSLEEVE: Brand: CONVERTORS

## (undated) DEVICE — TUBING ARTHROSCOPIC WAVE  MAIN PUMP

## (undated) DEVICE — T-MAX DISPOSABLE FACE MASK 8 PER BOX

## (undated) DEVICE — GAUZE SPONGES,16 PLY: Brand: CURITY

## (undated) DEVICE — NEEDLE HYPO 23G X 1-1/2 IN